# Patient Record
Sex: FEMALE | Race: WHITE | NOT HISPANIC OR LATINO | Employment: UNEMPLOYED | URBAN - METROPOLITAN AREA
[De-identification: names, ages, dates, MRNs, and addresses within clinical notes are randomized per-mention and may not be internally consistent; named-entity substitution may affect disease eponyms.]

---

## 2017-01-04 ENCOUNTER — OFFICE VISIT (OUTPATIENT)
Dept: FAMILY MEDICINE CLINIC | Facility: CLINIC | Age: 42
End: 2017-01-04

## 2017-01-04 VITALS
OXYGEN SATURATION: 97 % | WEIGHT: 191.8 LBS | SYSTOLIC BLOOD PRESSURE: 105 MMHG | HEART RATE: 111 BPM | DIASTOLIC BLOOD PRESSURE: 70 MMHG | BODY MASS INDEX: 33.98 KG/M2 | TEMPERATURE: 98.3 F

## 2017-01-04 DIAGNOSIS — J45.901 ASTHMA EXACERBATION: ICD-10-CM

## 2017-01-04 DIAGNOSIS — J98.01 BRONCHOSPASM: Primary | ICD-10-CM

## 2017-01-04 DIAGNOSIS — F41.9 ANXIETY: ICD-10-CM

## 2017-01-04 PROCEDURE — 94640 AIRWAY INHALATION TREATMENT: CPT | Performed by: FAMILY MEDICINE

## 2017-01-04 PROCEDURE — 99213 OFFICE O/P EST LOW 20 MIN: CPT | Performed by: FAMILY MEDICINE

## 2017-01-04 RX ORDER — LORAZEPAM 1 MG/1
1 TABLET ORAL EVERY 8 HOURS PRN
Qty: 90 TABLET | Refills: 0 | Status: SHIPPED | OUTPATIENT
Start: 2017-01-04 | End: 2017-02-01 | Stop reason: SDUPTHER

## 2017-01-04 RX ORDER — BENZONATATE 200 MG/1
200 CAPSULE ORAL 3 TIMES DAILY PRN
Qty: 60 CAPSULE | Refills: 2 | Status: SHIPPED | OUTPATIENT
Start: 2017-01-04 | End: 2017-09-14

## 2017-01-04 RX ORDER — LEVALBUTEROL INHALATION SOLUTION 0.63 MG/3ML
0.63 SOLUTION RESPIRATORY (INHALATION) EVERY 6 HOURS PRN
Status: DISCONTINUED | OUTPATIENT
Start: 2017-01-04 | End: 2018-06-12

## 2017-01-04 RX ORDER — MONTELUKAST SODIUM 10 MG/1
10 TABLET ORAL NIGHTLY
Qty: 30 TABLET | Refills: 5 | Status: SHIPPED | OUTPATIENT
Start: 2017-01-04 | End: 2017-09-14

## 2017-01-04 RX ORDER — LEVALBUTEROL INHALATION SOLUTION 0.63 MG/3ML
1 SOLUTION RESPIRATORY (INHALATION) EVERY 8 HOURS PRN
Qty: 90 AMPULE | Refills: 12 | Status: SHIPPED | OUTPATIENT
Start: 2017-01-04 | End: 2018-06-12

## 2017-01-04 RX ADMIN — LEVALBUTEROL INHALATION SOLUTION 0.63 MG: 0.63 SOLUTION RESPIRATORY (INHALATION) at 14:45

## 2017-01-04 NOTE — MR AVS SNAPSHOT
Charline Cook   1/4/2017 2:45 PM   Office Visit    Dept Phone:  [phone]   Encounter #:  [id number]    Provider:  Yvette Tirado MD   Department:  [facility] PRIMARY CARE                Your Full Care Plan              Today's Medication Changes          These changes are accurate as of: 1/4/17  2:58 PM.  If you have any questions, ask your nurse or doctor.               New Medication(s)Ordered:     benzonatate 200 MG capsule   Commonly known as:  TESSALON   Take 1 capsule by mouth 3 (Three) Times a Day As Needed for cough.   Started by:  Yvette Tirado MD       levalbuterol 0.63 MG/3ML nebulizer solution   Commonly known as:  XOPENEX   Take 1 ampule by nebulization Every 8 (Eight) Hours As Needed for wheezing.   Started by:  Yvette Tirado MD       LORazepam 1 MG tablet   Commonly known as:  ATIVAN   Take 1 tablet by mouth Every 8 (Eight) Hours As Needed for anxiety.   Started by:  Yvette Tirado MD         Stop taking medication(s)listed here:     albuterol (2.5 MG/3ML) 0.083% nebulizer solution   Commonly known as:  PROVENTIL   Stopped by:  Yvette Tirado MD           albuterol 108 (90 BASE) MCG/ACT inhaler   Commonly known as:  PROVENTIL HFA;VENTOLIN HFA   Stopped by:  Yvette Tirado MD           diazePAM 5 MG tablet   Commonly known as:  VALIUM   Stopped by:  Yvette Tirado MD                Where to Get Your Medications      These medications were sent to [facility] [address] [place] [address] [address]  AT [facility] [address] - [phone] [facility] [phone]   [address] [facility] [address]     Phone:  [phone]     benzonatate 200 MG capsule    levalbuterol 0.63 MG/3ML nebulizer solution         You can get these medications from any pharmacy     Bring a paper prescription for each of these medications     LORazepam 1 MG tablet                  Your Updated Medication  List          This list is accurate as of: 1/4/17  2:58 PM.  Always use your most recent med list.                benzonatate 200 MG capsule   Commonly known as:  TESSALON   Take 1 capsule by mouth 3 (Three) Times a Day As Needed for cough.       DULoxetine 60 MG capsule   Commonly known as:  CYMBALTA       gabapentin 800 MG tablet   Commonly known as:  NEURONTIN   One qid       haloperidol 1 MG tablet   Commonly known as:  HALDOL   TAKE 1 TABLET BY MOUTH THREE TIMES DAILY       hydroxychloroquine 200 MG tablet   Commonly known as:  PLAQUENIL       ipratropium 0.02 % nebulizer solution   Commonly known as:  ATROVENT   Use one vial per nebulizer       levalbuterol 0.63 MG/3ML nebulizer solution   Commonly known as:  XOPENEX   Take 1 ampule by nebulization Every 8 (Eight) Hours As Needed for wheezing.       LORazepam 1 MG tablet   Commonly known as:  ATIVAN   Take 1 tablet by mouth Every 8 (Eight) Hours As Needed for anxiety.       ondansetron 4 MG tablet   Commonly known as:  ZOFRAN       potassium chloride 10 MEQ CR capsule   Commonly known as:  MICRO-K       * rizatriptan 10 MG tablet   Commonly known as:  MAXALT       * rizatriptan MLT 10 MG disintegrating tablet   Commonly known as:  MAXALT-MLT   DISSOLVE 1 TABLET IN MOUTH AT ONSET OF HEADACHE. MAY REPEAT EVERY TWO HOURS AS NEEDED, MAX OF 3 TABLETS IN 24 HOURS       traMADol 50 MG tablet   Commonly known as:  ULTRAM   TAKE 2 TABLETS BY MOUTH FOUR TIMES DAILY AS NEEDED       venlafaxine 75 MG tablet   Commonly known as:  EFFEXOR   TAKE 3 TABLETS BY MOUTH DAILY       zolpidem 5 MG tablet   Commonly known as:  AMBIEN   TAKE 1 TABLET BY MOUTH EVERY DAY AT BEDTIME AS NEEDED FOR SLEEP       * Notice:  This list has 2 medication(s) that are the same as other medications prescribed for you. Read the directions carefully, and ask your doctor or other care provider to review them with you.            You Were Diagnosed With        Codes Comments    Bronchospasm    -   Primary ICD-10-CM: J98.01  ICD-9-CM: 519.11     Asthma exacerbation     ICD-10-CM: J45.901  ICD-9-CM: 493.92     Anxiety     ICD-10-CM: F41.9  ICD-9-CM: 300.00       Medications to be Given to You by a Medical Professional     Due       Frequency    (none) levalbuterol (XOPENEX) nebulizer solution 0.63 mg  Every 6 Hours PRN      Instructions     None    Patient Instructions History      Upcoming Appointments     Visit Type Date Time Department    OFFICE VISIT 2017  2:45 PM [place] Signup     [facility] [employer] allows you to send messages to your doctor, view your test results, renew your prescriptions, schedule appointments, and more. To sign up, go to [employer] and click on the Sign Up Now link in the New User? box. Enter your [employer] Activation Code exactly as it appears below along with the last four digits of your Social Security Number and your Date of Birth () to complete the sign-up process. If you do not sign up before the expiration date, you must request a new code.    [employer] Activation Code: [email or web address]  Expires: 2017  2:58 PM    If you have questions, you can email [email or web address] or call [phone] to talk to our [employer] staff. Remember, [employer] is NOT to be used for urgent needs. For medical emergencies, dial 911.               Other Info from Your Visit           Allergies     Butorphanol      Meperidine      Methylprednisolone      Prednisone        Reason for Visit     Cough     URI           Vital Signs     Blood Pressure Pulse Temperature Weight Last Menstrual Period Oxygen Saturation    105/70 111 98.3 °F (36.8 °C) 191 lb 12.8 oz (87 kg) (LMP Unknown) 97%    Body Mass Index Smoking Status                33.98 kg/m2 Never Smoker          Problems and Diagnoses Noted     Anxiety problem    Bronchial spasms    -  Primary    Asthma exacerbation          Medications Administered     levalbuterol (XOPENEX) nebulizer solution  0.63 mg

## 2017-01-04 NOTE — PROGRESS NOTES
Subjective   Charline Cook is a 41 y.o. female. CC: cough    History of Present Illness     Charline is a 41 year old female who comes in today with the complaint that she is not improving from her last visit here on 12/15.  After taking the steroids she felt better for about a week but the symptoms recurred on 12/24 and have persisted.  She complains of wheezing and shortness of breath.  She coughs but it is nonproductive.  She is taking Duoneb nebulizer treatments at home every 4 hours while awake.  They help for an hour or two and then the wheezing recurs.  She has tried Delsym for the cough but it does not help at all.  She has a cough which keeps her awake.      The following portions of the patient's history were reviewed and updated as appropriate: allergies, current medications, past medical history, past social history, past surgical history and problem list.    Review of Systems   Constitutional: Positive for fatigue.   HENT: Negative.  Negative for congestion, rhinorrhea and sinus pressure.    Respiratory: Positive for cough, shortness of breath and wheezing.    Cardiovascular: Negative.  Negative for chest pain, palpitations and leg swelling.   Gastrointestinal: Negative.  Negative for abdominal pain and nausea.   Genitourinary: Negative.  Negative for difficulty urinating.   Musculoskeletal: Positive for myalgias.   Skin: Negative.  Negative for rash.   Neurological: Negative.  Negative for dizziness, light-headedness and headaches.   Psychiatric/Behavioral: The patient is nervous/anxious.        Objective   Physical Exam   Constitutional: She appears well-developed and well-nourished. No distress.   Cardiovascular: Normal rate, regular rhythm and normal heart sounds.    No murmur heard.  Pulmonary/Chest: Effort normal. She has decreased breath sounds in the right upper field, the right middle field, the right lower field, the left upper field, the left middle field and the left lower field. She has wheezes  in the right upper field, the right middle field, the right lower field, the left upper field, the left middle field and the left lower field. She has no rhonchi. She has no rales.   Nursing note and vitals reviewed.  Breath sounds much improved after the nebulizer treatment.  Pulse ox remained the same at 97%.  Patient reported that her breathing was much improved.  Vitals:    01/04/17 1424   BP: 105/70   Pulse: 111   Temp: 98.3 °F (36.8 °C)   SpO2: 97%     Assessment/Plan   Problems Addressed this Visit        Other    Anxiety    Relevant Medications    LORazepam (ATIVAN) 1 MG tablet      Other Visit Diagnoses     Bronchospasm    -  Primary    Relevant Medications    levalbuterol (XOPENEX) nebulizer solution 0.63 mg    levalbuterol (XOPENEX) 0.63 MG/3ML nebulizer solution    benzonatate (TESSALON) 200 MG capsule    montelukast (SINGULAIR) 10 MG tablet    Asthma exacerbation        Relevant Medications    levalbuterol (XOPENEX) 0.63 MG/3ML nebulizer solution    montelukast (SINGULAIR) 10 MG tablet

## 2017-01-16 RX ORDER — ZOLPIDEM TARTRATE 5 MG/1
TABLET ORAL
Qty: 30 TABLET | Refills: 0 | OUTPATIENT
Start: 2017-01-16 | End: 2017-02-17 | Stop reason: SDUPTHER

## 2017-01-18 RX ORDER — TRAMADOL HYDROCHLORIDE 50 MG/1
TABLET ORAL
Qty: 240 TABLET | Refills: 0 | OUTPATIENT
Start: 2017-01-18 | End: 2017-04-09 | Stop reason: SDUPTHER

## 2017-02-01 DIAGNOSIS — F41.9 ANXIETY: ICD-10-CM

## 2017-02-02 RX ORDER — LORAZEPAM 1 MG/1
TABLET ORAL
Qty: 90 TABLET | Refills: 0 | OUTPATIENT
Start: 2017-02-02 | End: 2017-02-28 | Stop reason: SDUPTHER

## 2017-02-03 DIAGNOSIS — R11.0 NAUSEA: Primary | ICD-10-CM

## 2017-02-03 RX ORDER — ONDANSETRON 4 MG/1
4 TABLET, FILM COATED ORAL EVERY 6 HOURS
Qty: 20 TABLET | Refills: 0 | Status: SHIPPED | OUTPATIENT
Start: 2017-02-03 | End: 2017-03-12 | Stop reason: SDUPTHER

## 2017-02-10 ENCOUNTER — TELEPHONE (OUTPATIENT)
Dept: FAMILY MEDICINE CLINIC | Facility: CLINIC | Age: 42
End: 2017-02-10

## 2017-02-10 NOTE — TELEPHONE ENCOUNTER
Pt is out of town and forgot her ativan medication and was wondering if it is ok if we can call in 3 days worth to the pharmacy?

## 2017-02-10 NOTE — TELEPHONE ENCOUNTER
Okay, call her in Ativan 1 mg by mouth daily when necessary anxiety, #3.  And please do a [place] on her last one was in July

## 2017-02-17 RX ORDER — ZOLPIDEM TARTRATE 5 MG/1
TABLET ORAL
Qty: 30 TABLET | Refills: 0 | Status: SHIPPED | OUTPATIENT
Start: 2017-02-17 | End: 2017-03-21 | Stop reason: SDUPTHER

## 2017-02-28 DIAGNOSIS — F41.9 ANXIETY: ICD-10-CM

## 2017-03-01 RX ORDER — LORAZEPAM 1 MG/1
TABLET ORAL
Qty: 90 TABLET | Refills: 0 | OUTPATIENT
Start: 2017-03-01 | End: 2017-03-07 | Stop reason: SDUPTHER

## 2017-03-07 ENCOUNTER — OFFICE VISIT (OUTPATIENT)
Dept: FAMILY MEDICINE CLINIC | Facility: CLINIC | Age: 42
End: 2017-03-07

## 2017-03-07 VITALS
DIASTOLIC BLOOD PRESSURE: 70 MMHG | HEART RATE: 103 BPM | SYSTOLIC BLOOD PRESSURE: 110 MMHG | WEIGHT: 186.8 LBS | HEIGHT: 63 IN | TEMPERATURE: 98.6 F | BODY MASS INDEX: 33.1 KG/M2 | OXYGEN SATURATION: 98 %

## 2017-03-07 DIAGNOSIS — M79.7 FIBROMYALGIA: ICD-10-CM

## 2017-03-07 DIAGNOSIS — F41.9 ANXIETY: Primary | ICD-10-CM

## 2017-03-07 PROCEDURE — 99213 OFFICE O/P EST LOW 20 MIN: CPT | Performed by: FAMILY MEDICINE

## 2017-03-07 RX ORDER — GABAPENTIN 800 MG/1
TABLET ORAL
Qty: 120 TABLET | Refills: 5 | Status: SHIPPED | OUTPATIENT
Start: 2017-03-07 | End: 2017-08-16 | Stop reason: SDUPTHER

## 2017-03-07 RX ORDER — DULOXETIN HYDROCHLORIDE 60 MG/1
60 CAPSULE, DELAYED RELEASE ORAL DAILY
Qty: 30 CAPSULE | Refills: 5 | Status: SHIPPED | OUTPATIENT
Start: 2017-03-07 | End: 2017-08-30 | Stop reason: SDUPTHER

## 2017-03-07 RX ORDER — LORAZEPAM 1 MG/1
1 TABLET ORAL EVERY 8 HOURS PRN
Qty: 90 TABLET | Refills: 0 | Status: SHIPPED | OUTPATIENT
Start: 2017-03-07 | End: 2017-04-09 | Stop reason: SDUPTHER

## 2017-03-07 NOTE — PROGRESS NOTES
Subjective   Charline Cook is a 41 y.o. female. CC: anxiety    History of Present Illness   Charline is a 41 year old female who comes in today for anxiety.  She needs refills on the Ativan and the Cymbalta.  She reports that both medications are working well.  She also needs a new prescriptions for Gabapentin.  It has helped relieve her symptoms a great deal.  Overall she reports that she is doing fairly well.        The following portions of the patient's history were reviewed and updated as appropriate: allergies, current medications, past medical history, past social history, past surgical history and problem list.    Review of Systems   Constitutional: Negative.  Negative for fatigue.   HENT: Negative.  Negative for congestion, postnasal drip and rhinorrhea.    Respiratory: Negative.  Negative for cough, shortness of breath and wheezing.    Cardiovascular: Negative.  Negative for chest pain, palpitations and leg swelling.   Gastrointestinal: Negative.  Negative for abdominal pain, blood in stool, constipation, diarrhea, nausea and vomiting.   Genitourinary: Negative.  Negative for difficulty urinating, flank pain and hematuria.   Musculoskeletal: Positive for myalgias.   Skin: Negative.  Negative for rash.   Neurological: Negative.  Negative for dizziness, light-headedness and headaches.   Psychiatric/Behavioral: Positive for dysphoric mood. Negative for sleep disturbance. The patient is nervous/anxious.        Objective   Physical Exam   Constitutional: She is oriented to person, place, and time. She appears well-developed and well-nourished.   Cardiovascular: Normal rate, regular rhythm and normal heart sounds.    No murmur heard.  Pulmonary/Chest: Effort normal and breath sounds normal. No respiratory distress.   Neurological: She is alert and oriented to person, place, and time. No cranial nerve deficit.   Skin: Skin is warm and dry. No rash noted.   Psychiatric: She has a normal mood and affect. Her behavior  is normal.   Nursing note and vitals reviewed.    Vitals:    03/07/17 1514   BP: 110/70   Pulse: 103   Temp: 98.6 °F (37 °C)   SpO2: 98%     Assessment/Plan   Problems Addressed this Visit        Musculoskeletal and Integument    Fibromyalgia    Relevant Medications    gabapentin (NEURONTIN) 800 MG tablet       Other    Anxiety - Primary    Relevant Medications    DULoxetine (CYMBALTA) 60 MG capsule    LORazepam (ATIVAN) 1 MG tablet        The patient has read and signed the [facility] [employer] Substance Contract.  I will continue to see patient for regular follow up appointments.  They are well controlled on their medication.  KHUSHBOO is updated every 3 months. The patient is aware of the potential for addiction and dependence.    The patient has read and signed the [facility] [employer] Substance Contract.  I will continue to see patient for regular follow up appointments.  They are well controlled on their medication.  KHUSHBOO is updated every 3 months. The patient is aware of the potential for addiction. The patient has read and signed the [facility] [employer] Substance Contract.  I will continue to see patient for regular follow up appointments.  They are well controlled on their medication.  KHUSHBOO is updated every 3 months. The patient is aware of the potential for addiction and dependence.on and dependence.

## 2017-03-12 DIAGNOSIS — R11.0 NAUSEA: ICD-10-CM

## 2017-03-13 RX ORDER — ONDANSETRON 4 MG/1
TABLET, FILM COATED ORAL
Qty: 20 TABLET | Refills: 0 | Status: SHIPPED | OUTPATIENT
Start: 2017-03-13 | End: 2017-04-09 | Stop reason: SDUPTHER

## 2017-03-21 RX ORDER — ZOLPIDEM TARTRATE 5 MG/1
5 TABLET ORAL NIGHTLY PRN
Qty: 30 TABLET | Refills: 0 | OUTPATIENT
Start: 2017-03-21 | End: 2017-05-09 | Stop reason: SDUPTHER

## 2017-04-09 DIAGNOSIS — R11.0 NAUSEA: ICD-10-CM

## 2017-04-09 DIAGNOSIS — F32.A DEPRESSION: ICD-10-CM

## 2017-04-09 DIAGNOSIS — F41.9 ANXIETY: ICD-10-CM

## 2017-04-10 RX ORDER — ONDANSETRON 4 MG/1
TABLET, FILM COATED ORAL
Qty: 20 TABLET | Refills: 0 | Status: SHIPPED | OUTPATIENT
Start: 2017-04-10 | End: 2017-09-14

## 2017-04-10 RX ORDER — TRAMADOL HYDROCHLORIDE 50 MG/1
TABLET ORAL
Qty: 240 TABLET | Refills: 0 | OUTPATIENT
Start: 2017-04-10 | End: 2017-07-12 | Stop reason: SDUPTHER

## 2017-04-10 RX ORDER — LORAZEPAM 1 MG/1
TABLET ORAL
Qty: 90 TABLET | Refills: 0 | OUTPATIENT
Start: 2017-04-10 | End: 2017-05-07 | Stop reason: SDUPTHER

## 2017-04-10 RX ORDER — VENLAFAXINE 75 MG/1
TABLET ORAL
Qty: 90 TABLET | Refills: 0 | Status: SHIPPED | OUTPATIENT
Start: 2017-04-10 | End: 2017-05-08 | Stop reason: SDUPTHER

## 2017-04-10 RX ORDER — HALOPERIDOL 1 MG/1
TABLET ORAL
Qty: 90 TABLET | Refills: 0 | Status: SHIPPED | OUTPATIENT
Start: 2017-04-10 | End: 2017-05-10 | Stop reason: SDUPTHER

## 2017-05-07 DIAGNOSIS — F41.9 ANXIETY: ICD-10-CM

## 2017-05-08 DIAGNOSIS — F32.A DEPRESSION: ICD-10-CM

## 2017-05-08 RX ORDER — LORAZEPAM 1 MG/1
TABLET ORAL
Qty: 90 TABLET | Refills: 0 | Status: SHIPPED | OUTPATIENT
Start: 2017-05-08 | End: 2017-06-06 | Stop reason: SDUPTHER

## 2017-05-09 RX ORDER — VENLAFAXINE 75 MG/1
TABLET ORAL
Qty: 90 TABLET | Refills: 0 | Status: SHIPPED | OUTPATIENT
Start: 2017-05-09 | End: 2017-06-04 | Stop reason: SDUPTHER

## 2017-05-10 RX ORDER — HALOPERIDOL 1 MG/1
TABLET ORAL
Qty: 90 TABLET | Refills: 0 | Status: SHIPPED | OUTPATIENT
Start: 2017-05-10 | End: 2017-06-04 | Stop reason: SDUPTHER

## 2017-05-10 RX ORDER — ZOLPIDEM TARTRATE 5 MG/1
5 TABLET ORAL NIGHTLY PRN
Qty: 30 TABLET | Refills: 0 | OUTPATIENT
Start: 2017-05-10 | End: 2017-06-11 | Stop reason: SDUPTHER

## 2017-06-04 DIAGNOSIS — F32.A DEPRESSION: ICD-10-CM

## 2017-06-05 RX ORDER — HALOPERIDOL 1 MG/1
TABLET ORAL
Qty: 90 TABLET | Refills: 0 | Status: SHIPPED | OUTPATIENT
Start: 2017-06-05 | End: 2017-07-09 | Stop reason: SDUPTHER

## 2017-06-05 RX ORDER — VENLAFAXINE 75 MG/1
TABLET ORAL
Qty: 90 TABLET | Refills: 0 | Status: SHIPPED | OUTPATIENT
Start: 2017-06-05 | End: 2017-07-09 | Stop reason: SDUPTHER

## 2017-06-06 DIAGNOSIS — F41.9 ANXIETY: ICD-10-CM

## 2017-06-07 RX ORDER — LORAZEPAM 1 MG/1
TABLET ORAL
Qty: 90 TABLET | Refills: 0 | Status: SHIPPED | OUTPATIENT
Start: 2017-06-07 | End: 2017-07-05 | Stop reason: SDUPTHER

## 2017-06-11 DIAGNOSIS — F51.01 PRIMARY INSOMNIA: Primary | ICD-10-CM

## 2017-06-12 DIAGNOSIS — F51.01 PRIMARY INSOMNIA: ICD-10-CM

## 2017-06-12 RX ORDER — ZOLPIDEM TARTRATE 5 MG/1
TABLET ORAL
Qty: 30 TABLET | Refills: 0 | OUTPATIENT
Start: 2017-06-12 | End: 2017-07-09 | Stop reason: SDUPTHER

## 2017-06-12 RX ORDER — ZOLPIDEM TARTRATE 5 MG/1
TABLET ORAL
Qty: 30 TABLET | Refills: 0 | OUTPATIENT
Start: 2017-06-12

## 2017-06-13 ENCOUNTER — OFFICE VISIT (OUTPATIENT)
Dept: FAMILY MEDICINE CLINIC | Facility: CLINIC | Age: 42
End: 2017-06-13

## 2017-06-13 VITALS
WEIGHT: 175 LBS | TEMPERATURE: 99.3 F | DIASTOLIC BLOOD PRESSURE: 80 MMHG | HEIGHT: 63 IN | OXYGEN SATURATION: 98 % | BODY MASS INDEX: 31.01 KG/M2 | SYSTOLIC BLOOD PRESSURE: 115 MMHG | HEART RATE: 104 BPM

## 2017-06-13 DIAGNOSIS — F51.01 PRIMARY INSOMNIA: ICD-10-CM

## 2017-06-13 DIAGNOSIS — M79.7 FIBROMYALGIA: ICD-10-CM

## 2017-06-13 DIAGNOSIS — F41.9 ANXIETY: Primary | ICD-10-CM

## 2017-06-13 PROCEDURE — 99213 OFFICE O/P EST LOW 20 MIN: CPT | Performed by: FAMILY MEDICINE

## 2017-06-13 NOTE — PROGRESS NOTES
Subjective   Charline Cook is a 41 y.o. female. CC: anxiety    History of Present Illness   Charline is a 41 year old female who comes in today for check on her anxiety.  She reports that she is doing well on the current medication regimen.  Lorazepam keeps her anxiety under good control.  Tramadol is helping to control the pain she has from fibromyalgia.  She sleeps well on the Ambien.  No adverse side effects from any of the medications.      The following portions of the patient's history were reviewed and updated as appropriate: allergies, current medications, past medical history, past social history, past surgical history and problem list.    Review of Systems   Constitutional: Positive for fatigue. Negative for unexpected weight change.   HENT: Negative.  Negative for congestion.    Respiratory: Negative.  Negative for cough, shortness of breath and wheezing.    Cardiovascular: Negative.  Negative for chest pain, palpitations and leg swelling.   Gastrointestinal: Negative.  Negative for abdominal pain, blood in stool, constipation, diarrhea, nausea and vomiting.   Genitourinary: Negative.  Negative for difficulty urinating, frequency and hematuria.   Musculoskeletal: Positive for myalgias.   Skin: Negative.  Negative for rash.   Neurological: Negative.  Negative for dizziness, light-headedness and headaches.   Psychiatric/Behavioral: Positive for sleep disturbance. The patient is nervous/anxious.        Objective   Physical Exam   Constitutional: She is oriented to person, place, and time. She appears well-developed and well-nourished.   Neck: Normal range of motion. Neck supple. No thyromegaly present.   Cardiovascular: Normal rate, regular rhythm and normal heart sounds.    No murmur heard.  Pulmonary/Chest: Effort normal and breath sounds normal. No respiratory distress.   Neurological: She is alert and oriented to person, place, and time.   Skin: Skin is warm and dry. No rash noted.   Psychiatric: She has a  normal mood and affect. Her behavior is normal.   Nursing note and vitals reviewed.    Vitals:    06/13/17 1450   BP: 115/80   Pulse: 104   Temp: 99.3 °F (37.4 °C)   SpO2: 98%     Assessment/Plan   Problems Addressed this Visit        Musculoskeletal and Integument    Fibromyalgia       Other    Anxiety - Primary    Insomnia        She will have her pharmacy contact us when refills on her medications are needed.    The patient has read and signed the [facility] Substance Contract.  I will continue to see patient for regular follow up appointments.  They are well controlled on their medication.  KHUSHBOO is updated every 3 months. The patient is aware of the potential for addiction and dependence.    The patient has read and signed the [employer] Substance Contract.  I will continue to see patient for regular follow up appointments.  They are well controlled on their medication.  KHUSHBOO is updated every 3 months. The patient is aware of the potential for addiction. The patient has read and signed the [employer] Substance Contract.  I will continue to see patient for regular follow up appointments.  They are well controlled on their medication.  KHUSHBOO is updated every 3 months. The patient is aware of the potential for addiction and dependence.on and dependence.

## 2017-07-05 DIAGNOSIS — F41.9 ANXIETY: ICD-10-CM

## 2017-07-05 RX ORDER — LORAZEPAM 1 MG/1
1 TABLET ORAL EVERY 8 HOURS PRN
Qty: 90 TABLET | Refills: 0 | OUTPATIENT
Start: 2017-07-05 | End: 2017-08-02 | Stop reason: SDUPTHER

## 2017-07-05 NOTE — TELEPHONE ENCOUNTER
Last refill 6/7/17 #90 takes it 1 every 8 hours as needed  Last seen 06/13/17    Pt is leaving out of town with  tomorrow and would like a refill before then.

## 2017-07-09 DIAGNOSIS — F32.A DEPRESSION: ICD-10-CM

## 2017-07-09 DIAGNOSIS — F51.01 PRIMARY INSOMNIA: ICD-10-CM

## 2017-07-10 RX ORDER — HALOPERIDOL 1 MG/1
TABLET ORAL
Qty: 90 TABLET | Refills: 0 | Status: SHIPPED | OUTPATIENT
Start: 2017-07-10 | End: 2017-08-02 | Stop reason: SDUPTHER

## 2017-07-10 RX ORDER — ZOLPIDEM TARTRATE 5 MG/1
TABLET ORAL
Qty: 30 TABLET | Refills: 0 | OUTPATIENT
Start: 2017-07-10 | End: 2017-08-08 | Stop reason: SDUPTHER

## 2017-07-10 RX ORDER — VENLAFAXINE 75 MG/1
TABLET ORAL
Qty: 90 TABLET | Refills: 0 | Status: SHIPPED | OUTPATIENT
Start: 2017-07-10 | End: 2017-08-08 | Stop reason: SDUPTHER

## 2017-07-12 RX ORDER — TRAMADOL HYDROCHLORIDE 50 MG/1
TABLET ORAL
Qty: 240 TABLET | Refills: 0 | OUTPATIENT
Start: 2017-07-12 | End: 2017-09-15 | Stop reason: SDUPTHER

## 2017-08-02 DIAGNOSIS — F41.9 ANXIETY: ICD-10-CM

## 2017-08-02 DIAGNOSIS — G43.009 MIGRAINE WITHOUT AURA AND WITHOUT STATUS MIGRAINOSUS, NOT INTRACTABLE: ICD-10-CM

## 2017-08-02 RX ORDER — HALOPERIDOL 1 MG/1
TABLET ORAL
Qty: 90 TABLET | Refills: 0 | Status: SHIPPED | OUTPATIENT
Start: 2017-08-02 | End: 2017-08-30 | Stop reason: SDUPTHER

## 2017-08-02 RX ORDER — LORAZEPAM 1 MG/1
TABLET ORAL
Qty: 90 TABLET | Refills: 0 | OUTPATIENT
Start: 2017-08-02 | End: 2017-08-30 | Stop reason: SDUPTHER

## 2017-08-02 RX ORDER — RIZATRIPTAN BENZOATE 10 MG/1
TABLET, ORALLY DISINTEGRATING ORAL
Qty: 12 TABLET | Refills: 5 | Status: SHIPPED | OUTPATIENT
Start: 2017-08-02 | End: 2018-05-16 | Stop reason: SDUPTHER

## 2017-08-03 ENCOUNTER — TELEPHONE (OUTPATIENT)
Dept: FAMILY MEDICINE CLINIC | Facility: CLINIC | Age: 42
End: 2017-08-03

## 2017-08-03 NOTE — TELEPHONE ENCOUNTER
Pt said that she is having the feeling to urinate and she doesn't have to, pressure, no burning or itching.

## 2017-08-03 NOTE — TELEPHONE ENCOUNTER
Pt called and said that she has another uti she said that she gets it every time she has intercourse. She was wondering if you can call in Macro[employer] for her?

## 2017-08-08 DIAGNOSIS — F51.01 PRIMARY INSOMNIA: ICD-10-CM

## 2017-08-08 DIAGNOSIS — F32.A DEPRESSION: ICD-10-CM

## 2017-08-08 DIAGNOSIS — M79.7 FIBROMYALGIA: ICD-10-CM

## 2017-08-09 RX ORDER — VENLAFAXINE 75 MG/1
TABLET ORAL
Qty: 90 TABLET | Refills: 0 | Status: SHIPPED | OUTPATIENT
Start: 2017-08-09 | End: 2017-09-07 | Stop reason: SDUPTHER

## 2017-08-09 RX ORDER — GABAPENTIN 800 MG/1
TABLET ORAL
Qty: 120 TABLET | Refills: 0 | OUTPATIENT
Start: 2017-08-09

## 2017-08-09 RX ORDER — ZOLPIDEM TARTRATE 5 MG/1
TABLET ORAL
Qty: 30 TABLET | Refills: 0 | OUTPATIENT
Start: 2017-08-09 | End: 2017-09-07 | Stop reason: SDUPTHER

## 2017-08-16 DIAGNOSIS — M79.7 FIBROMYALGIA: ICD-10-CM

## 2017-08-16 RX ORDER — GABAPENTIN 800 MG/1
TABLET ORAL
Qty: 120 TABLET | Refills: 0 | OUTPATIENT
Start: 2017-08-16 | End: 2017-09-10 | Stop reason: SDUPTHER

## 2017-08-30 DIAGNOSIS — F41.9 ANXIETY: ICD-10-CM

## 2017-08-31 RX ORDER — DULOXETIN HYDROCHLORIDE 60 MG/1
CAPSULE, DELAYED RELEASE ORAL
Qty: 30 CAPSULE | Refills: 0 | Status: SHIPPED | OUTPATIENT
Start: 2017-08-31 | End: 2017-10-04 | Stop reason: SDUPTHER

## 2017-08-31 RX ORDER — HALOPERIDOL 1 MG/1
TABLET ORAL
Qty: 90 TABLET | Refills: 0 | Status: SHIPPED | OUTPATIENT
Start: 2017-08-31 | End: 2017-10-04 | Stop reason: SDUPTHER

## 2017-08-31 RX ORDER — LORAZEPAM 1 MG/1
TABLET ORAL
Qty: 90 TABLET | Refills: 0 | OUTPATIENT
Start: 2017-08-31 | End: 2017-09-28 | Stop reason: SDUPTHER

## 2017-09-07 DIAGNOSIS — F51.01 PRIMARY INSOMNIA: ICD-10-CM

## 2017-09-07 DIAGNOSIS — F32.A DEPRESSION: ICD-10-CM

## 2017-09-08 RX ORDER — ZOLPIDEM TARTRATE 5 MG/1
TABLET ORAL
Qty: 30 TABLET | Refills: 0 | OUTPATIENT
Start: 2017-09-08 | End: 2017-10-10 | Stop reason: SDUPTHER

## 2017-09-08 RX ORDER — VENLAFAXINE 75 MG/1
TABLET ORAL
Qty: 90 TABLET | Refills: 0 | Status: SHIPPED | OUTPATIENT
Start: 2017-09-08 | End: 2017-10-04 | Stop reason: SDUPTHER

## 2017-09-10 DIAGNOSIS — M79.7 FIBROMYALGIA: ICD-10-CM

## 2017-09-11 RX ORDER — GABAPENTIN 800 MG/1
TABLET ORAL
Qty: 120 TABLET | Refills: 0 | OUTPATIENT
Start: 2017-09-11 | End: 2017-10-12 | Stop reason: SDUPTHER

## 2017-09-14 ENCOUNTER — OFFICE VISIT (OUTPATIENT)
Dept: FAMILY MEDICINE CLINIC | Facility: CLINIC | Age: 42
End: 2017-09-14

## 2017-09-14 VITALS
BODY MASS INDEX: 31.34 KG/M2 | HEART RATE: 113 BPM | HEIGHT: 63 IN | DIASTOLIC BLOOD PRESSURE: 60 MMHG | WEIGHT: 176.9 LBS | OXYGEN SATURATION: 98 % | TEMPERATURE: 98 F | SYSTOLIC BLOOD PRESSURE: 115 MMHG

## 2017-09-14 DIAGNOSIS — Z23 ENCOUNTER FOR IMMUNIZATION: ICD-10-CM

## 2017-09-14 DIAGNOSIS — M79.7 FIBROMYALGIA: Primary | ICD-10-CM

## 2017-09-14 DIAGNOSIS — F41.9 ANXIETY: ICD-10-CM

## 2017-09-14 PROCEDURE — 99213 OFFICE O/P EST LOW 20 MIN: CPT | Performed by: FAMILY MEDICINE

## 2017-09-14 PROCEDURE — 90471 IMMUNIZATION ADMIN: CPT | Performed by: FAMILY MEDICINE

## 2017-09-14 PROCEDURE — 90686 IIV4 VACC NO PRSV 0.5 ML IM: CPT | Performed by: FAMILY MEDICINE

## 2017-09-14 NOTE — PROGRESS NOTES
Subjective   Charline Cook is a 41 y.o. female. CC: anxiety    History of Present Illness   Charline is a 41 year old female who comes in today for anxiety.  She does not need any prescriptions today, just due for her check up.  Reviewed her medications.  She states that she is only taking the Tramadol twice a day.    She is feeling better than she has in a long time and is happy with her current medication regimens.  She sometimes does not take the full dose of the Gabapentin.  Would like her flu shot today.        The following portions of the patient's history were reviewed and updated as appropriate: allergies, current medications, past medical history, past social history, past surgical history and problem list.    Review of Systems   Constitutional: Positive for fatigue. Negative for unexpected weight change.   HENT: Negative.  Negative for congestion.    Respiratory: Negative.  Negative for cough, shortness of breath and wheezing.    Cardiovascular: Negative.  Negative for chest pain, palpitations and leg swelling.   Gastrointestinal: Negative.  Negative for blood in stool, constipation, diarrhea and nausea.   Genitourinary: Negative.  Negative for difficulty urinating.   Musculoskeletal: Positive for arthralgias and myalgias.   Skin: Negative.  Negative for rash.   Neurological: Negative.  Negative for dizziness, light-headedness and headaches.   Psychiatric/Behavioral: Positive for dysphoric mood and sleep disturbance. The patient is nervous/anxious.        Objective   Physical Exam   Constitutional: She is oriented to person, place, and time. She appears well-developed and well-nourished.   Neck: Normal range of motion. Neck supple.   Cardiovascular: Normal rate, regular rhythm and normal heart sounds.    Pulmonary/Chest: Effort normal and breath sounds normal.   Neurological: She is alert and oriented to person, place, and time.   Skin: Skin is warm and dry. No rash noted.   Psychiatric: She has a normal mood  and affect. Her behavior is normal.   Nursing note and vitals reviewed.    Vitals:    09/14/17 1505   BP: 115/60   Pulse: 113   Temp: 98 °F (36.7 °C)   SpO2: 98%     Assessment/Plan   Problems Addressed this Visit        Musculoskeletal and Integument    Fibromyalgia - Primary       Other    Anxiety      Other Visit Diagnoses     Encounter for immunization        Relevant Orders    Flu Vaccine Intradermal Quad 18-64YR (FLUZONE [id number])        After obtaining informed consent, the immunization is given by Amie CORTES    The patient has read and signed the [facility] [employer] Substance Contract.  I will continue to see patient for regular follow up appointments.  They are well controlled on their medication.  KHUSHBOO is updated every 3 months. The patient is aware of the potential for addiction and dependence.    The patient has read and signed the [facility] [employer] Substance Contract.  I will continue to see patient for regular follow up appointments.  They are well controlled on their medication.  KHUSHBOO is updated every 3 months. The patient is aware of the potential for addiction. The patient has read and signed the [facility] [employer] Substance Contract.  I will continue to see patient for regular follow up appointments.  They are well controlled on their medication.  KHUSHBOO is updated every 3 months. The patient is aware of the potential for addiction and dependence.on and dependence.

## 2017-09-15 RX ORDER — TRAMADOL HYDROCHLORIDE 50 MG/1
50 TABLET ORAL 2 TIMES DAILY
Qty: 60 TABLET | Refills: 0 | OUTPATIENT
Start: 2017-09-15 | End: 2017-11-08 | Stop reason: SDUPTHER

## 2017-09-28 DIAGNOSIS — F41.9 ANXIETY: ICD-10-CM

## 2017-09-28 RX ORDER — LORAZEPAM 1 MG/1
TABLET ORAL
Qty: 90 TABLET | Refills: 0 | OUTPATIENT
Start: 2017-09-28 | End: 2017-12-14

## 2017-10-04 DIAGNOSIS — J20.9 BRONCHOSPASM WITH BRONCHITIS, ACUTE: ICD-10-CM

## 2017-10-04 DIAGNOSIS — F41.9 ANXIETY: ICD-10-CM

## 2017-10-04 DIAGNOSIS — F32.A DEPRESSION: ICD-10-CM

## 2017-10-04 RX ORDER — ALBUTEROL SULFATE 90 UG/1
AEROSOL, METERED RESPIRATORY (INHALATION)
Qty: 18 G | Refills: 0 | Status: SHIPPED | OUTPATIENT
Start: 2017-10-04 | End: 2017-12-09 | Stop reason: SDUPTHER

## 2017-10-04 RX ORDER — HALOPERIDOL 1 MG/1
TABLET ORAL
Qty: 90 TABLET | Refills: 0 | Status: SHIPPED | OUTPATIENT
Start: 2017-10-04 | End: 2017-11-02 | Stop reason: SDUPTHER

## 2017-10-04 RX ORDER — DULOXETIN HYDROCHLORIDE 60 MG/1
CAPSULE, DELAYED RELEASE ORAL
Qty: 30 CAPSULE | Refills: 0 | Status: SHIPPED | OUTPATIENT
Start: 2017-10-04 | End: 2017-11-02 | Stop reason: SDUPTHER

## 2017-10-04 RX ORDER — VENLAFAXINE 75 MG/1
TABLET ORAL
Qty: 90 TABLET | Refills: 0 | Status: SHIPPED | OUTPATIENT
Start: 2017-10-04 | End: 2017-11-02 | Stop reason: SDUPTHER

## 2017-10-10 DIAGNOSIS — F51.01 PRIMARY INSOMNIA: ICD-10-CM

## 2017-10-10 RX ORDER — ZOLPIDEM TARTRATE 5 MG/1
TABLET ORAL
Qty: 30 TABLET | Refills: 0 | OUTPATIENT
Start: 2017-10-10 | End: 2017-12-14

## 2017-10-12 DIAGNOSIS — M79.7 FIBROMYALGIA: ICD-10-CM

## 2017-10-13 RX ORDER — GABAPENTIN 800 MG/1
TABLET ORAL
Qty: 120 TABLET | Refills: 0 | OUTPATIENT
Start: 2017-10-13 | End: 2017-11-09 | Stop reason: SDUPTHER

## 2017-11-02 DIAGNOSIS — F41.9 ANXIETY: ICD-10-CM

## 2017-11-02 DIAGNOSIS — F32.A DEPRESSION: ICD-10-CM

## 2017-11-02 RX ORDER — DULOXETIN HYDROCHLORIDE 60 MG/1
CAPSULE, DELAYED RELEASE ORAL
Qty: 30 CAPSULE | Refills: 0 | Status: SHIPPED | OUTPATIENT
Start: 2017-11-02 | End: 2017-12-06 | Stop reason: SDUPTHER

## 2017-11-02 RX ORDER — HALOPERIDOL 1 MG/1
TABLET ORAL
Qty: 90 TABLET | Refills: 0 | Status: SHIPPED | OUTPATIENT
Start: 2017-11-02 | End: 2017-12-04 | Stop reason: SDUPTHER

## 2017-11-02 RX ORDER — VENLAFAXINE 75 MG/1
TABLET ORAL
Qty: 90 TABLET | Refills: 0 | Status: SHIPPED | OUTPATIENT
Start: 2017-11-02 | End: 2017-12-04 | Stop reason: SDUPTHER

## 2017-11-08 RX ORDER — TRAMADOL HYDROCHLORIDE 50 MG/1
50 TABLET ORAL 2 TIMES DAILY
Qty: 60 TABLET | Refills: 0 | OUTPATIENT
Start: 2017-11-08 | End: 2017-12-07 | Stop reason: SDUPTHER

## 2017-11-09 DIAGNOSIS — M79.7 FIBROMYALGIA: ICD-10-CM

## 2017-11-10 RX ORDER — GABAPENTIN 800 MG/1
TABLET ORAL
Qty: 120 TABLET | Refills: 0 | OUTPATIENT
Start: 2017-11-10 | End: 2017-12-07 | Stop reason: SDUPTHER

## 2017-12-04 DIAGNOSIS — F32.A DEPRESSION: ICD-10-CM

## 2017-12-04 RX ORDER — VENLAFAXINE 75 MG/1
TABLET ORAL
Qty: 90 TABLET | Refills: 0 | Status: SHIPPED | OUTPATIENT
Start: 2017-12-04 | End: 2018-01-02 | Stop reason: SDUPTHER

## 2017-12-04 RX ORDER — HALOPERIDOL 1 MG/1
TABLET ORAL
Qty: 90 TABLET | Refills: 0 | Status: SHIPPED | OUTPATIENT
Start: 2017-12-04 | End: 2018-03-09 | Stop reason: SDUPTHER

## 2017-12-06 DIAGNOSIS — F41.9 ANXIETY: ICD-10-CM

## 2017-12-06 RX ORDER — DULOXETIN HYDROCHLORIDE 60 MG/1
CAPSULE, DELAYED RELEASE ORAL
Qty: 30 CAPSULE | Refills: 5 | Status: SHIPPED | OUTPATIENT
Start: 2017-12-06 | End: 2018-06-05 | Stop reason: SDUPTHER

## 2017-12-07 DIAGNOSIS — M79.7 FIBROMYALGIA: ICD-10-CM

## 2017-12-07 RX ORDER — TRAMADOL HYDROCHLORIDE 50 MG/1
TABLET ORAL
Qty: 60 TABLET | Refills: 0 | OUTPATIENT
Start: 2017-12-07 | End: 2018-01-02 | Stop reason: SDUPTHER

## 2017-12-07 RX ORDER — GABAPENTIN 800 MG/1
TABLET ORAL
Qty: 120 TABLET | Refills: 0 | OUTPATIENT
Start: 2017-12-07 | End: 2018-01-02 | Stop reason: SDUPTHER

## 2017-12-08 DIAGNOSIS — M79.7 FIBROMYALGIA: ICD-10-CM

## 2017-12-08 RX ORDER — GABAPENTIN 800 MG/1
TABLET ORAL
Qty: 120 TABLET | Refills: 0 | OUTPATIENT
Start: 2017-12-08

## 2017-12-08 RX ORDER — TRAMADOL HYDROCHLORIDE 50 MG/1
TABLET ORAL
Qty: 60 TABLET | Refills: 0 | OUTPATIENT
Start: 2017-12-08

## 2017-12-09 DIAGNOSIS — J20.9 BRONCHOSPASM WITH BRONCHITIS, ACUTE: ICD-10-CM

## 2017-12-11 RX ORDER — ALBUTEROL SULFATE 90 UG/1
AEROSOL, METERED RESPIRATORY (INHALATION)
Qty: 18 G | Refills: 0 | Status: SHIPPED | OUTPATIENT
Start: 2017-12-11 | End: 2018-06-12

## 2017-12-14 ENCOUNTER — OFFICE VISIT (OUTPATIENT)
Dept: FAMILY MEDICINE CLINIC | Facility: CLINIC | Age: 42
End: 2017-12-14

## 2017-12-14 VITALS
TEMPERATURE: 98.7 F | BODY MASS INDEX: 31.5 KG/M2 | OXYGEN SATURATION: 97 % | SYSTOLIC BLOOD PRESSURE: 115 MMHG | HEART RATE: 108 BPM | WEIGHT: 177.8 LBS | HEIGHT: 63 IN | DIASTOLIC BLOOD PRESSURE: 65 MMHG

## 2017-12-14 DIAGNOSIS — M32.9 SYSTEMIC LUPUS ERYTHEMATOSUS, UNSPECIFIED SLE TYPE, UNSPECIFIED ORGAN INVOLVEMENT STATUS (HCC): ICD-10-CM

## 2017-12-14 DIAGNOSIS — M79.7 FIBROMYALGIA: Primary | ICD-10-CM

## 2017-12-14 PROCEDURE — 99213 OFFICE O/P EST LOW 20 MIN: CPT | Performed by: FAMILY MEDICINE

## 2017-12-14 NOTE — PROGRESS NOTES
Subjective   Charline Cook is a 42 y.o. female. CC: fibromyalgia    History of Present Illness   Charline is a 42 year old female who comes in today for fibromyalgia.  She states that she is feeling much better recently.  She has stopped taking Ativan, Ambien and Plaquenil.  She has more energy and just feels better overall.  She has cut the Tramadol down to one bid.  That dose is working well for her and keeps the pain under control.  She sometimes adds Ibuprofen if needs something else.  The Gabapentin is helping a great deal.  She does not need any refills today---just needed to be seen for her 3 month check.        The following portions of the patient's history were reviewed and updated as appropriate: allergies, current medications, past medical history, past social history, past surgical history and problem list.    Review of Systems   Constitutional: Negative.  Negative for fatigue and unexpected weight change.   HENT: Negative.  Negative for congestion.    Respiratory: Negative.  Negative for cough, shortness of breath and wheezing.    Cardiovascular: Negative.  Negative for chest pain, palpitations and leg swelling.   Gastrointestinal: Negative.  Negative for abdominal pain, constipation, diarrhea and nausea.   Genitourinary: Negative.  Negative for difficulty urinating and hematuria.   Musculoskeletal: Positive for arthralgias, back pain and myalgias.   Skin: Negative.  Negative for rash.   Neurological: Negative.  Negative for dizziness, light-headedness and headaches.   Psychiatric/Behavioral: Positive for dysphoric mood. Negative for sleep disturbance. The patient is nervous/anxious.        Objective   Physical Exam   Constitutional: She is oriented to person, place, and time. She appears well-developed and well-nourished.   Cardiovascular: Normal rate, regular rhythm and normal heart sounds.    Pulmonary/Chest: Effort normal and breath sounds normal.   Neurological: She is alert and oriented to person,  "place, and time.   Skin: Skin is warm and dry. No rash noted.   Psychiatric: She has a normal mood and affect. Her behavior is normal.   Nursing note and vitals reviewed.    Vitals:    12/14/17 1525   BP: 115/65   Pulse: 108   Temp: 98.7 °F (37.1 °C)   SpO2: 97%   Weight: 80.6 kg (177 lb 12.8 oz)   Height: 160 cm (62.99\")     Assessment/Plan   Problems Addressed this Visit        Immune and Lymphatic    Systemic lupus erythematosus       Other    Fibromyalgia - Primary        The patient has read and signed the [facility] Controlled Substance Contract.  I will continue to see patient for regular follow up appointments.  They are well controlled on their medication.  KHUSHBOO is updated every 3 months. The patient is aware of the potential for addiction and dependence.         "

## 2018-01-02 DIAGNOSIS — M79.7 FIBROMYALGIA: ICD-10-CM

## 2018-01-02 DIAGNOSIS — F32.A DEPRESSION: ICD-10-CM

## 2018-01-02 RX ORDER — VENLAFAXINE 75 MG/1
TABLET ORAL
Qty: 90 TABLET | Refills: 5 | Status: SHIPPED | OUTPATIENT
Start: 2018-01-02 | End: 2018-07-05 | Stop reason: SDUPTHER

## 2018-01-02 RX ORDER — GABAPENTIN 800 MG/1
TABLET ORAL
Qty: 120 TABLET | Refills: 0 | OUTPATIENT
Start: 2018-01-02 | End: 2018-01-23 | Stop reason: SDUPTHER

## 2018-01-02 RX ORDER — TRAMADOL HYDROCHLORIDE 50 MG/1
TABLET ORAL
Qty: 60 TABLET | Refills: 0 | OUTPATIENT
Start: 2018-01-02 | End: 2018-02-12 | Stop reason: SDUPTHER

## 2018-01-23 ENCOUNTER — OFFICE VISIT (OUTPATIENT)
Dept: FAMILY MEDICINE CLINIC | Facility: CLINIC | Age: 43
End: 2018-01-23

## 2018-01-23 VITALS
HEART RATE: 101 BPM | SYSTOLIC BLOOD PRESSURE: 128 MMHG | OXYGEN SATURATION: 100 % | HEIGHT: 63 IN | WEIGHT: 167 LBS | BODY MASS INDEX: 29.59 KG/M2 | DIASTOLIC BLOOD PRESSURE: 70 MMHG

## 2018-01-23 DIAGNOSIS — E87.6 HYPOKALEMIA: ICD-10-CM

## 2018-01-23 DIAGNOSIS — F51.01 PRIMARY INSOMNIA: Primary | ICD-10-CM

## 2018-01-23 DIAGNOSIS — G43.909 MIGRAINE WITHOUT STATUS MIGRAINOSUS, NOT INTRACTABLE, UNSPECIFIED MIGRAINE TYPE: ICD-10-CM

## 2018-01-23 DIAGNOSIS — M79.7 FIBROMYALGIA: ICD-10-CM

## 2018-01-23 DIAGNOSIS — M32.9 SYSTEMIC LUPUS ERYTHEMATOSUS, UNSPECIFIED SLE TYPE, UNSPECIFIED ORGAN INVOLVEMENT STATUS (HCC): ICD-10-CM

## 2018-01-23 DIAGNOSIS — R79.89 ELEVATED LFTS: ICD-10-CM

## 2018-01-23 PROCEDURE — 99214 OFFICE O/P EST MOD 30 MIN: CPT | Performed by: FAMILY MEDICINE

## 2018-01-23 RX ORDER — GABAPENTIN 800 MG/1
800 TABLET ORAL 4 TIMES DAILY
Qty: 120 TABLET | Refills: 0 | Status: SHIPPED | OUTPATIENT
Start: 2018-01-23 | End: 2018-01-23 | Stop reason: SDUPTHER

## 2018-01-23 RX ORDER — GABAPENTIN 800 MG/1
800 TABLET ORAL 4 TIMES DAILY
Qty: 120 TABLET | Refills: 0 | Status: SHIPPED | OUTPATIENT
Start: 2018-01-23 | End: 2018-03-09 | Stop reason: SDUPTHER

## 2018-01-23 RX ORDER — BENZONATATE 200 MG/1
200 CAPSULE ORAL 3 TIMES DAILY PRN
Qty: 60 CAPSULE | Refills: 1 | Status: SHIPPED | OUTPATIENT
Start: 2018-01-23 | End: 2018-06-12

## 2018-01-23 RX ORDER — AMITRIPTYLINE HYDROCHLORIDE 10 MG/1
10 TABLET, FILM COATED ORAL NIGHTLY
Qty: 30 TABLET | Refills: 2 | Status: SHIPPED | OUTPATIENT
Start: 2018-01-23 | End: 2018-04-12

## 2018-01-23 RX ORDER — BUDESONIDE AND FORMOTEROL FUMARATE DIHYDRATE 160; 4.5 UG/1; UG/1
2 AEROSOL RESPIRATORY (INHALATION)
Qty: 10.2 G | Refills: 12 | Status: SHIPPED | OUTPATIENT
Start: 2018-01-23 | End: 2018-06-12

## 2018-01-23 RX ORDER — CARBOXYMETHYLCELLULOSE SODIUM, GLYCERIN, AND POLYSORBATE 80 5; 10; 5 MG/ML; MG/ML; MG/ML
SOLUTION/ DROPS OPHTHALMIC
COMMUNITY
Start: 2017-10-17 | End: 2022-01-27

## 2018-01-23 NOTE — PROGRESS NOTES
Subjective   Charline Cook is a 42 y.o. female. Pt is established in the office with Dr. Tirado. She is here with concerns about her asthma and possible exacerbation starting, insomnia, migraines.    Chief Complaint   Patient presents with   • Establish Care   • Med Refill     maxalt        History of Present Illness    Hx of hospitalization for lupus flare, stomach issue, vomiting, hypokalemia at [place], prior to this in [facility] (4 weeks prior). Was under the care of Dr. De Luna with GI. Seen by psych started on haldol she thinks for psychosis. Never saw the psychiatrist again and never taken off the haldol. She does not have a diagnosis of schizophrenia. She is taking the haldol 3 mg once daily though it is prescribed 1 mg TID. This was around 2013-14.    Asthma  Usually goes into flare in January. Usually last about 4 months. Now is having coughing going on for about 3 days at this time. Worse at night. Doing atrovent nightly and the albuterol inhaler. Feels better breathing with this but still has coughing. Uses the xopenex when she wheezes. Finds the benzonatate helpful for her cough. Was last hospitalized for asthma 1-2 years ago, never had to be in ICU or intubated for her asthma flare. Has been on singulair for about one year. Also been on zyrtec and claritin previously with no help.     Insomnia  Chronic problem. Has tried lunesta, helped for a period of time. Did not like ambien. Is taking unisom 1-2 each night. Tries to keep bed time at 10 pm but trouble falling asleep. Will finally sleep most of the time from 4 am to 9 am. Has TV on in room when trying to sleep.  is always traveling so does not share room or bed with anyone most of the time.     Migraines  Chronic problem. Tried on topomax but did not get relief, this was long time ago. She is taking Maxalt about 15 times per month. Gets headache about half days each month. Has intense nausea and vomiting with these, thinks that is why she  "has lost the weight. Used to having lots of vomiting most of her life, has also abdominal symptoms she reports related to her Lupus.     Dx with SLE at age 17, was seen by rheum but her doctor retired and she did not get another one, Dr. Tirado accepted care. Pt weaned herself off of plaquenil.  The following portions of the patient's history were reviewed and updated as appropriate: allergies, current medications, past family history, past medical history, past social history, past surgical history and problem list.    Review of Systems   Constitutional: Negative for activity change and appetite change.   HENT: Negative for congestion and rhinorrhea.    Respiratory: Positive for cough and shortness of breath. Negative for wheezing.    Cardiovascular: Negative for chest pain.   Gastrointestinal: Positive for constipation, nausea and vomiting.   Neurological: Positive for headaches.   Psychiatric/Behavioral: Positive for sleep disturbance.       Objective   Blood pressure 128/70, pulse 101, height 160 cm (62.99\"), weight 75.8 kg (167 lb), SpO2 100 %, not currently breastfeeding.  Physical Exam   Constitutional: She is oriented to person, place, and time. She appears well-nourished. No distress.   HENT:   Right Ear: External ear normal.   Left Ear: External ear normal.   Nose: Nose normal.   Mouth/Throat: Oropharynx is clear and moist. No oropharyngeal exudate.   TMs and canals normal.   Neck: Neck supple. No thyromegaly present.   Cardiovascular: Regular rhythm and normal heart sounds.  Exam reveals no gallop and no friction rub.    No murmur heard.  tachycardic   Pulmonary/Chest: Effort normal and breath sounds normal. No respiratory distress. She has no wheezes. She has no rales.   Musculoskeletal: She exhibits no edema.   Lymphadenopathy:     She has no cervical adenopathy.   Neurological: She is alert and oriented to person, place, and time.   Psychiatric: She has a normal mood and affect. Her behavior is " normal.   Vitals reviewed.      Assessment/Plan   Charline was seen today for establish care and med refill.    Diagnoses and all orders for this visit:    Primary insomnia    Migraine without status migrainosus, not intractable, unspecified migraine type    Fibromyalgia  -     Discontinue: gabapentin (NEURONTIN) 800 MG tablet; Take 1 tablet by mouth 4 (Four) Times a Day.  -     gabapentin (NEURONTIN) 800 MG tablet; Take 1 tablet by mouth 4 (Four) Times a Day.    Systemic lupus erythematosus, unspecified SLE type, unspecified organ involvement status  -     CBC & Differential    Elevated LFTs  -     Comprehensive Metabolic Panel    Hypokalemia  -     Comprehensive Metabolic Panel    Other orders  -     benzonatate (TESSALON) 200 MG capsule; Take 1 capsule by mouth 3 (Three) Times a Day As Needed for Cough.  -     budesonide-formoterol (SYMBICORT) 160-4.5 MCG/ACT inhaler; Inhale 2 puffs 2 (Two) Times a Day.  -     amitriptyline (ELAVIL) 10 MG tablet; Take 1 tablet by mouth Every Night.    insomnia  Big concern for pt, would love to get better sleep  Discussed sleep hygiene, good bed time, avoid screens  Start low dose amitriptyline  Could be worsened by the Haldol    Migraine  Needs to be on controller, will have to work on medications next time  Could tolerate propranolol given her pulse (she did not just take albuterol) says topirimate did not work for her  Can consider wean and d/c haldol as no obvious indication for this outside of the hospital    SLE  Care in the office  Not currently on medication for lupus  Reports abdominal pain, liver problems with high LFTs, and poor vision to the point she can no longer drive herself related to her SLE  May need to revisit seeing rheum    Asthma  Pt has never been on ICS per her report  Will try, counseled to use with spacer, use scheduled, and rinse mouth  Refilled benzonatate    Close follow up, 3 months, pt to call if having any issues/symptoms    Lots of potentials for  interactions in her medications with haldol, duloxetine, gabapentin, venlafaxine, amitriptyline

## 2018-01-24 LAB
ALBUMIN SERPL-MCNC: 4.5 G/DL (ref 3.5–5.2)
ALBUMIN/GLOB SERPL: 1.5 G/DL
ALP SERPL-CCNC: 154 U/L (ref 39–117)
ALT SERPL-CCNC: 12 U/L (ref 1–33)
AST SERPL-CCNC: 13 U/L (ref 1–32)
BASOPHILS # BLD AUTO: 0.06 10*3/MM3 (ref 0–0.2)
BASOPHILS NFR BLD AUTO: 1.3 % (ref 0–1.5)
BILIRUB SERPL-MCNC: 0.2 MG/DL (ref 0.1–1.2)
BUN SERPL-MCNC: 8 MG/DL (ref 6–20)
BUN/CREAT SERPL: 9.8 (ref 7–25)
CALCIUM SERPL-MCNC: 9.2 MG/DL (ref 8.6–10.5)
CHLORIDE SERPL-SCNC: 99 MMOL/L (ref 98–107)
CO2 SERPL-SCNC: 26.3 MMOL/L (ref 22–29)
CREAT SERPL-MCNC: 0.82 MG/DL (ref 0.57–1)
DIFFERENTIAL COMMENT: NORMAL
EOSINOPHIL # BLD AUTO: 0.21 10*3/MM3 (ref 0–0.7)
EOSINOPHIL NFR BLD AUTO: 4.7 % (ref 0.3–6.2)
ERYTHROCYTE [DISTWIDTH] IN BLOOD BY AUTOMATED COUNT: 17 % (ref 11.7–13)
GLOBULIN SER CALC-MCNC: 3 GM/DL
GLUCOSE SERPL-MCNC: 77 MG/DL (ref 65–99)
HCT VFR BLD AUTO: 32.2 % (ref 35.6–45.5)
HGB BLD-MCNC: 9.6 G/DL (ref 11.9–15.5)
IMM GRANULOCYTES # BLD: 0 10*3/MM3 (ref 0–0.03)
IMM GRANULOCYTES NFR BLD: 0 % (ref 0–0.5)
LYMPHOCYTES # BLD AUTO: 1.42 10*3/MM3 (ref 0.9–4.8)
LYMPHOCYTES NFR BLD AUTO: 31.6 % (ref 19.6–45.3)
MCH RBC QN AUTO: 28.6 PG (ref 26.9–32)
MCHC RBC AUTO-ENTMCNC: 29.8 G/DL (ref 32.4–36.3)
MCV RBC AUTO: 95.8 FL (ref 80.5–98.2)
MONOCYTES # BLD AUTO: 1.12 10*3/MM3 (ref 0.2–1.2)
MONOCYTES NFR BLD AUTO: 24.9 % (ref 5–12)
NEUTROPHILS # BLD AUTO: 1.69 10*3/MM3 (ref 1.9–8.1)
NEUTROPHILS NFR BLD AUTO: 37.5 % (ref 42.7–76)
PLATELET # BLD AUTO: 392 10*3/MM3 (ref 140–500)
PLATELET BLD QL SMEAR: NORMAL
POTASSIUM SERPL-SCNC: 4.1 MMOL/L (ref 3.5–5.2)
PROT SERPL-MCNC: 7.5 G/DL (ref 6–8.5)
RBC # BLD AUTO: 3.36 10*6/MM3 (ref 3.9–5.2)
RBC MORPH BLD: NORMAL
SODIUM SERPL-SCNC: 141 MMOL/L (ref 136–145)
WBC # BLD AUTO: 4.5 10*3/MM3 (ref 4.5–10.7)

## 2018-02-12 ENCOUNTER — TELEPHONE (OUTPATIENT)
Dept: FAMILY MEDICINE CLINIC | Facility: CLINIC | Age: 43
End: 2018-02-12

## 2018-02-12 DIAGNOSIS — M79.7 FIBROMYALGIA: ICD-10-CM

## 2018-02-12 DIAGNOSIS — R11.0 NAUSEA: ICD-10-CM

## 2018-02-12 RX ORDER — ONDANSETRON 4 MG/1
TABLET, FILM COATED ORAL
Qty: 20 TABLET | Refills: 0 | OUTPATIENT
Start: 2018-02-12 | End: 2018-02-16 | Stop reason: SDUPTHER

## 2018-02-12 RX ORDER — TRAMADOL HYDROCHLORIDE 50 MG/1
TABLET ORAL
Qty: 60 TABLET | Refills: 0 | OUTPATIENT
Start: 2018-02-12 | End: 2018-03-09 | Stop reason: SDUPTHER

## 2018-02-13 RX ORDER — HALOPERIDOL 1 MG/1
TABLET ORAL
Qty: 90 TABLET | Refills: 0 | OUTPATIENT
Start: 2018-02-13

## 2018-02-16 DIAGNOSIS — R11.0 NAUSEA: ICD-10-CM

## 2018-02-16 RX ORDER — ONDANSETRON 4 MG/1
4 TABLET, FILM COATED ORAL EVERY 6 HOURS PRN
Qty: 20 TABLET | Refills: 0 | Status: SHIPPED | OUTPATIENT
Start: 2018-02-16 | End: 2018-11-16 | Stop reason: SDUPTHER

## 2018-03-08 DIAGNOSIS — M79.7 FIBROMYALGIA: ICD-10-CM

## 2018-03-08 RX ORDER — GABAPENTIN 800 MG/1
TABLET ORAL
Qty: 120 TABLET | Refills: 0 | Status: CANCELLED | OUTPATIENT
Start: 2018-03-08

## 2018-03-08 RX ORDER — HALOPERIDOL 1 MG/1
TABLET ORAL
Qty: 60 TABLET | Refills: 0 | Status: CANCELLED | OUTPATIENT
Start: 2018-03-08

## 2018-03-09 DIAGNOSIS — M79.7 FIBROMYALGIA: ICD-10-CM

## 2018-03-09 RX ORDER — GABAPENTIN 800 MG/1
800 TABLET ORAL 4 TIMES DAILY
Qty: 120 TABLET | Refills: 0 | OUTPATIENT
Start: 2018-03-09 | End: 2018-06-04 | Stop reason: SDUPTHER

## 2018-03-09 RX ORDER — HALOPERIDOL 1 MG/1
1 TABLET ORAL 2 TIMES DAILY
Qty: 60 TABLET | Refills: 1 | Status: SHIPPED | OUTPATIENT
Start: 2018-03-09 | End: 2018-06-12

## 2018-03-13 NOTE — TELEPHONE ENCOUNTER
I called in the gabapentin on 3/9/18. I also renewed the haldol at a new dose. Can you check on this please?

## 2018-03-13 NOTE — TELEPHONE ENCOUNTER
Called in patient's TRAMADOL 50 MG, I also checked to see if the patient had picked up her GABAPENTIN AND HALDOL, she picked both up on Sunday 11,2018

## 2018-03-19 RX ORDER — TRAMADOL HYDROCHLORIDE 50 MG/1
TABLET ORAL
Qty: 60 TABLET | Refills: 0 | OUTPATIENT
Start: 2018-03-19 | End: 2018-04-08 | Stop reason: SDUPTHER

## 2018-04-08 DIAGNOSIS — M79.7 FIBROMYALGIA: ICD-10-CM

## 2018-04-10 RX ORDER — TRAMADOL HYDROCHLORIDE 50 MG/1
TABLET ORAL
Qty: 60 TABLET | Refills: 0 | Status: SHIPPED | OUTPATIENT
Start: 2018-04-10 | End: 2018-05-07 | Stop reason: SDUPTHER

## 2018-04-12 ENCOUNTER — OFFICE VISIT (OUTPATIENT)
Dept: FAMILY MEDICINE CLINIC | Facility: CLINIC | Age: 43
End: 2018-04-12

## 2018-04-12 VITALS
TEMPERATURE: 99.1 F | HEART RATE: 124 BPM | BODY MASS INDEX: 30.12 KG/M2 | WEIGHT: 170 LBS | HEIGHT: 63 IN | DIASTOLIC BLOOD PRESSURE: 82 MMHG | SYSTOLIC BLOOD PRESSURE: 94 MMHG | OXYGEN SATURATION: 97 %

## 2018-04-12 DIAGNOSIS — D64.9 ANEMIA, UNSPECIFIED TYPE: ICD-10-CM

## 2018-04-12 DIAGNOSIS — E46 MALNUTRITION, UNSPECIFIED TYPE (HCC): ICD-10-CM

## 2018-04-12 DIAGNOSIS — G43.909 MIGRAINE WITHOUT STATUS MIGRAINOSUS, NOT INTRACTABLE, UNSPECIFIED MIGRAINE TYPE: ICD-10-CM

## 2018-04-12 DIAGNOSIS — R11.2 NAUSEA AND VOMITING, INTRACTABILITY OF VOMITING NOT SPECIFIED, UNSPECIFIED VOMITING TYPE: ICD-10-CM

## 2018-04-12 DIAGNOSIS — E86.0 DEHYDRATION: Primary | ICD-10-CM

## 2018-04-12 DIAGNOSIS — M32.9 SYSTEMIC LUPUS ERYTHEMATOSUS, UNSPECIFIED SLE TYPE, UNSPECIFIED ORGAN INVOLVEMENT STATUS (HCC): ICD-10-CM

## 2018-04-12 PROCEDURE — 99214 OFFICE O/P EST MOD 30 MIN: CPT | Performed by: FAMILY MEDICINE

## 2018-04-12 RX ORDER — PREDNISONE 10 MG/1
10 TABLET ORAL DAILY
Status: CANCELLED | OUTPATIENT
Start: 2018-04-12

## 2018-04-12 NOTE — PROGRESS NOTES
Subjective   Charline Cook is a 42 y.o. female.     Chief Complaint   Patient presents with   • Follow-up     medication and new symptoms        History of Present Illness    Lupus flare  Has previously been seen by neurology given her symptoms somewhat inclusive of MS. Does not see neurology for long time now.  This flare is different because her heart rate is elevated more than usual and her hands and feet are numb. Seen by Dr. Heineke with rheum but he retired 6 months ago and she was taken off of plaquenil because of significant vision changes so managed by prior PCP. Did steroids for flares. Pt has significant nausea, vomiting and dry heaves. Feels as though she is dehydrated. Has never been in a wheelchair before but came in one today because she will be too tired by all the walking required to come to the appointment, fear of passing out because she gets lightheaded when standing, and pain in her feet that is burning and worse when she puts pressure on them.    Has been tapering the haldol, unclear why she is on this medication and does not know that it has been helping her in anyway. Her pain is bad she take ibuprofen 800 mg TID but does not do this routinely. Avoids more potent pain medication because of the significant side effects she experiences. Takes her tramadol twice daily.    Insomnia  Tried amitriptyline for one month but did not help. Has never been a good sleeper.    Upon chart review note from Dr. Angie Carmona from 10/2013 with neurology to whom she was referred for evaluation of possible MS. Was having memory loss, emotional lability, headaches, neck pain, joint pain. Describes that pt was admitted to [facility] after became weak and collapsed. Had low BG and electrolyte imbalance. Cards evaluated her with tilt table that pt reported was positive, said she was dx with POTS. She had been doing a lot of passing out episodes 3x/wk for 2 months. Note described lupus as questionable and that work up  was negative including UA x3 and kidney U/S. Note goes EBV starting at age 17 years, and since has had intermittent neurologic complaints, pain, weakness, fatigue. Migraines since age 15 years, a variant with periodic episodes of syncope. In 2012 had several months with sudden onset joint pain, weakness, fatigue (symptoms worse anytime she has to have surgery or has an infection) and referred to rheum, treated for seronegative lupus with plaquenil and methotrexate. Methotrexate d/c'd by pt for oral ulcer right before seeing neuro.    Of note, by Angie Carmona'sAPRN documentation pt had normal visual field testing and pt reports significant peripheral vision loss warranting doctor's to be concerned about pituitary tumor in 2013 but had head imaging without lesion, per pt and confirmed in Kristine's note. Per her review of MRI brain +/- contrast from 9/26/13 at [facility] was normal. Labs: normal hepatitis panel, neg LAN, neg ds-DNA, normal RF, normal SSA, SSB, SCL 70, APA panel, cardiolipin B Ab, CMP, ESR, CRP, CBC. She had LP, MRI C and T spine, PT, speech, cards referral. She had EKG, stress test taht was overall normal without evidence for ischemia, normal EF/systolic function. She also had holter monitor showing predominant rhythm of sinus, no PVCs, there were >1600 APCs. When evaluated by Dr. Benji [facility] and dx with POTS and or vasovagal syncope, advised to wear compression stockings, aggressive hydration and sodium supplementation.    The following portions of the patient's history were reviewed and updated as appropriate: allergies, current medications, past family history, past medical history and problem list.      Review of Systems   Constitutional: Positive for activity change, appetite change and fatigue. Negative for chills, fever and unexpected weight change.   HENT: Negative for congestion and rhinorrhea.    Respiratory: Negative for cough.   "  Gastrointestinal: Positive for abdominal pain, nausea and vomiting. Negative for constipation and diarrhea.   Musculoskeletal: Positive for arthralgias and gait problem.   Neurological: Positive for numbness.   Psychiatric/Behavioral: Positive for sleep disturbance.       Objective   Blood pressure 94/82, pulse (!) 124, temperature 99.1 °F (37.3 °C), height 158.8 cm (62.5\"), weight 77.1 kg (170 lb), SpO2 97 %, not currently breastfeeding.  Physical Exam   Constitutional: She is oriented to person, place, and time. She appears well-nourished. No distress.   HENT:   Right Ear: External ear normal.   Left Ear: External ear normal.   Nose: Nose normal.   Mouth/Throat: Oropharynx is clear and moist.   Membranes with mild dryness/tacky   Eyes: Conjunctivae are normal. Right eye exhibits no discharge. Left eye exhibits no discharge. No scleral icterus.   Cardiovascular: Normal rate, regular rhythm, normal heart sounds and intact distal pulses.    No murmur heard.  Pulmonary/Chest: Effort normal and breath sounds normal. No respiratory distress. She has no wheezes.   Musculoskeletal: She exhibits no edema.   Neurological: She is alert and oriented to person, place, and time. She exhibits normal muscle tone.   BUE and BLE strength testing inconsistent. Moments she has 5/5 strength in all extremities then will decrease and strengthen, fluctuated. Normal sensation in B feet.   Skin: Skin is dry. No erythema.   Psychiatric: She has a normal mood and affect. Her behavior is normal.   Vitals reviewed.      Assessment/Plan   Charline was seen today for follow-up.    Diagnoses and all orders for this visit:    Dehydration  -     Comprehensive Metabolic Panel    Systemic lupus erythematosus, unspecified SLE type, unspecified organ involvement status  -     Comprehensive Metabolic Panel    Migraine without status migrainosus, not intractable, unspecified migraine type    Nausea and vomiting, intractability of vomiting not specified, " "unspecified vomiting type  -     Comprehensive Metabolic Panel    Malnutrition, unspecified type  -     Comprehensive Metabolic Panel  -     Vitamin B12    Anemia, unspecified type  -     CBC & Differential  -     Vitamin B12    Other orders  -     Cancel: predniSONE (DELTASONE) 10 MG tablet; Take 1 tablet by mouth Daily.    labs will dictate plan. Pt report concerning for dehydration and potential for electrolyte derangements but looks well overall. With electrolyte abnormalities will recommend she go to ER/admit, if labs look good will do course of steroids and refer to new rheumatology. Given chart review the underlying etiology for her recurring symptoms, \"flares\" are unclear.           "

## 2018-04-13 LAB
ALBUMIN SERPL-MCNC: 4.2 G/DL (ref 3.5–5.2)
ALBUMIN/GLOB SERPL: 1.4 G/DL
ALP SERPL-CCNC: 119 U/L (ref 39–117)
ALT SERPL-CCNC: 25 U/L (ref 1–33)
AST SERPL-CCNC: 19 U/L (ref 1–32)
BASOPHILS # BLD AUTO: 0.06 10*3/MM3 (ref 0–0.2)
BASOPHILS NFR BLD AUTO: 1.4 % (ref 0–1.5)
BILIRUB SERPL-MCNC: 0.2 MG/DL (ref 0.1–1.2)
BUN SERPL-MCNC: 8 MG/DL (ref 6–20)
BUN/CREAT SERPL: 10.7 (ref 7–25)
CALCIUM SERPL-MCNC: 9 MG/DL (ref 8.6–10.5)
CHLORIDE SERPL-SCNC: 102 MMOL/L (ref 98–107)
CO2 SERPL-SCNC: 25.3 MMOL/L (ref 22–29)
CREAT SERPL-MCNC: 0.75 MG/DL (ref 0.57–1)
DIFFERENTIAL COMMENT: NORMAL
EOSINOPHIL # BLD AUTO: 0.15 10*3/MM3 (ref 0–0.7)
EOSINOPHIL NFR BLD AUTO: 3.5 % (ref 0.3–6.2)
ERYTHROCYTE [DISTWIDTH] IN BLOOD BY AUTOMATED COUNT: 17.8 % (ref 11.7–13)
GFR SERPLBLD CREATININE-BSD FMLA CKD-EPI: 102 ML/MIN/1.73
GFR SERPLBLD CREATININE-BSD FMLA CKD-EPI: 84 ML/MIN/1.73
GLOBULIN SER CALC-MCNC: 3 GM/DL
GLUCOSE SERPL-MCNC: 86 MG/DL (ref 65–99)
HCT VFR BLD AUTO: 33.2 % (ref 35.6–45.5)
HGB BLD-MCNC: 9.3 G/DL (ref 11.9–15.5)
IMM GRANULOCYTES # BLD: 0 10*3/MM3 (ref 0–0.03)
IMM GRANULOCYTES NFR BLD: 0 % (ref 0–0.5)
LYMPHOCYTES # BLD AUTO: 1.52 10*3/MM3 (ref 0.9–4.8)
LYMPHOCYTES NFR BLD AUTO: 35.2 % (ref 19.6–45.3)
MCH RBC QN AUTO: 27 PG (ref 26.9–32)
MCHC RBC AUTO-ENTMCNC: 28 G/DL (ref 32.4–36.3)
MCV RBC AUTO: 96.2 FL (ref 80.5–98.2)
MONOCYTES # BLD AUTO: 1.08 10*3/MM3 (ref 0.2–1.2)
MONOCYTES NFR BLD AUTO: 25 % (ref 5–12)
NEUTROPHILS # BLD AUTO: 1.51 10*3/MM3 (ref 1.9–8.1)
NEUTROPHILS NFR BLD AUTO: 34.9 % (ref 42.7–76)
PLATELET # BLD AUTO: 397 10*3/MM3 (ref 140–500)
PLATELET BLD QL SMEAR: NORMAL
POTASSIUM SERPL-SCNC: 4.2 MMOL/L (ref 3.5–5.2)
PROT SERPL-MCNC: 7.2 G/DL (ref 6–8.5)
RBC # BLD AUTO: 3.45 10*6/MM3 (ref 3.9–5.2)
RBC MORPH BLD: NORMAL
SODIUM SERPL-SCNC: 143 MMOL/L (ref 136–145)
VIT B12 SERPL-MCNC: 213 PG/ML (ref 211–946)
WBC # BLD AUTO: 4.32 10*3/MM3 (ref 4.5–10.7)

## 2018-04-13 RX ORDER — PREDNISONE 10 MG/1
TABLET ORAL
Qty: 77 TABLET | Refills: 0 | Status: SHIPPED | OUTPATIENT
Start: 2018-04-13 | End: 2018-06-12

## 2018-04-16 ENCOUNTER — TELEPHONE (OUTPATIENT)
Dept: FAMILY MEDICINE CLINIC | Facility: CLINIC | Age: 43
End: 2018-04-16

## 2018-04-16 NOTE — TELEPHONE ENCOUNTER
Called and spoke with pt about her lab results. Her electrolytes are normal despite the report of continuous nausea and significant vomiting. Will go ahead and order course of steroids for potential for SLE flare. Advised that we get another rheumatologist on board for evaluating and treating. Pt is agreeable to this plan. To seek further medical help over the weekend if condition worsens. Pt voiced understanding.

## 2018-04-17 PROBLEM — R63.4 WEIGHT LOSS: Status: ACTIVE | Noted: 2018-04-17

## 2018-04-17 PROBLEM — R11.2 SEVERE NAUSEA AND VOMITING THAT HAS LASTED A LONG TIME: Status: ACTIVE | Noted: 2018-04-17

## 2018-05-07 DIAGNOSIS — M79.7 FIBROMYALGIA: ICD-10-CM

## 2018-05-09 RX ORDER — TRAMADOL HYDROCHLORIDE 50 MG/1
TABLET ORAL
Qty: 60 TABLET | Refills: 0 | Status: SHIPPED | OUTPATIENT
Start: 2018-05-09 | End: 2018-06-07 | Stop reason: SDUPTHER

## 2018-05-15 ENCOUNTER — TELEPHONE (OUTPATIENT)
Dept: FAMILY MEDICINE CLINIC | Facility: CLINIC | Age: 43
End: 2018-05-15

## 2018-05-15 NOTE — TELEPHONE ENCOUNTER
Patient called stating she has not been sleeping and wants a prescription of trazodone sent to pharmacy as well as her migraine medication

## 2018-05-16 DIAGNOSIS — G43.009 MIGRAINE WITHOUT AURA AND WITHOUT STATUS MIGRAINOSUS, NOT INTRACTABLE: ICD-10-CM

## 2018-05-16 RX ORDER — RIZATRIPTAN BENZOATE 10 MG/1
10 TABLET, ORALLY DISINTEGRATING ORAL ONCE AS NEEDED
Qty: 12 TABLET | Refills: 2 | Status: SHIPPED | OUTPATIENT
Start: 2018-05-16 | End: 2018-12-20 | Stop reason: SDUPTHER

## 2018-06-04 DIAGNOSIS — M79.7 FIBROMYALGIA: ICD-10-CM

## 2018-06-05 DIAGNOSIS — F41.9 ANXIETY: ICD-10-CM

## 2018-06-05 RX ORDER — DULOXETIN HYDROCHLORIDE 60 MG/1
60 CAPSULE, DELAYED RELEASE ORAL DAILY
Qty: 30 CAPSULE | Refills: 2 | Status: SHIPPED | OUTPATIENT
Start: 2018-06-05 | End: 2018-08-30 | Stop reason: SDUPTHER

## 2018-06-06 RX ORDER — GABAPENTIN 800 MG/1
TABLET ORAL
Qty: 120 TABLET | Refills: 0 | Status: SHIPPED | OUTPATIENT
Start: 2018-06-06 | End: 2018-07-04 | Stop reason: SDUPTHER

## 2018-06-07 DIAGNOSIS — M79.7 FIBROMYALGIA: ICD-10-CM

## 2018-06-08 RX ORDER — TRAMADOL HYDROCHLORIDE 50 MG/1
TABLET ORAL
Qty: 60 TABLET | Refills: 0 | Status: SHIPPED | OUTPATIENT
Start: 2018-06-08 | End: 2018-07-07 | Stop reason: SDUPTHER

## 2018-06-12 ENCOUNTER — OFFICE VISIT (OUTPATIENT)
Dept: FAMILY MEDICINE CLINIC | Facility: CLINIC | Age: 43
End: 2018-06-12

## 2018-06-12 VITALS
WEIGHT: 161 LBS | OXYGEN SATURATION: 98 % | BODY MASS INDEX: 28.53 KG/M2 | HEIGHT: 63 IN | SYSTOLIC BLOOD PRESSURE: 118 MMHG | DIASTOLIC BLOOD PRESSURE: 78 MMHG | HEART RATE: 122 BPM

## 2018-06-12 DIAGNOSIS — R11.2 NON-INTRACTABLE VOMITING WITH NAUSEA, UNSPECIFIED VOMITING TYPE: Primary | ICD-10-CM

## 2018-06-12 DIAGNOSIS — M32.9 SYSTEMIC LUPUS ERYTHEMATOSUS, UNSPECIFIED SLE TYPE, UNSPECIFIED ORGAN INVOLVEMENT STATUS (HCC): ICD-10-CM

## 2018-06-12 DIAGNOSIS — R20.2 NUMBNESS AND TINGLING OF BOTH LEGS BELOW KNEES: ICD-10-CM

## 2018-06-12 DIAGNOSIS — R20.0 NUMBNESS AND TINGLING OF BOTH LEGS BELOW KNEES: ICD-10-CM

## 2018-06-12 DIAGNOSIS — D64.9 ANEMIA, UNSPECIFIED TYPE: ICD-10-CM

## 2018-06-12 PROCEDURE — 99214 OFFICE O/P EST MOD 30 MIN: CPT | Performed by: FAMILY MEDICINE

## 2018-06-12 RX ORDER — PROMETHAZINE HYDROCHLORIDE 25 MG/1
25 TABLET ORAL EVERY 6 HOURS PRN
Qty: 24 TABLET | Refills: 3 | Status: SHIPPED | OUTPATIENT
Start: 2018-06-12 | End: 2018-08-01 | Stop reason: SDUPTHER

## 2018-06-12 RX ORDER — TRAZODONE HYDROCHLORIDE 50 MG/1
50 TABLET ORAL NIGHTLY
Qty: 30 TABLET | Refills: 1 | Status: SHIPPED | OUTPATIENT
Start: 2018-06-12 | End: 2018-08-01 | Stop reason: SDUPTHER

## 2018-06-12 NOTE — PROGRESS NOTES
Subjective   Charline Cook is a 42 y.o. female.     Chief Complaint   Patient presents with   • Dizziness   • Vomiting   • Numbness     legs, feet,hands        History of Present Illness  Vomiting  Not eating very much. Trying to eat more plant based diet for relief. Can't keep down. Trying watermelon and cucumbers. Drinking slushies but doesn't drink much, few oz here and there. Mostly drinking Mt. Dew and Sprite. Gets about 8 oz of Mt. Dew. Not taking zofran regularly because does not seem to help. Tolerated dinner yesterday. No blood in the vomit.  Foamy vomit, yellow. She has lost weight according to the vitals records. Nine lbs in 2 months.     Numbness  Legs are a trigger. When she stands her legs get so tired it makes her sick. Standing or walking minimally. If she stands for about 30 seconds she gets sensation in her calves and can walk down the stairs and to the back door and she feels sick. This has been going on for about 3-4 months now. Numb and pins and needles sensation, she has pain with this and it goes from her feet to her knees. Both legs and just woke up one morning with them like this.     Will have a good day or 2 at the max but then back with symptoms. She is using a wheelchair when she goes out of the house because of the legs. Keeping medications down for the most part. She is off the haldol for about 2 months. Irritable for two weeks and more anxiety but this all resolved. She has about 12 per month of her migraines.     Took the steroids and felt better for about 2 weeks. Joints are not bothering her as much right know. She was having to take iburprofen regularly but not right now.   Trouble sleeping. Feels like it is related to her legs moving feels like she has RLS, kicks. Sometimes hard fast, sometimes over and over again. Feels like it wakes her up but also says her legs are numb so she doesn't feel herself doing it.     The following portions of the patient's history were reviewed and  "updated as appropriate: allergies, current medications and problem list.    Review of Systems   Constitutional: Positive for activity change, appetite change and unexpected weight change.   Respiratory: Negative for cough and shortness of breath.    Gastrointestinal: Positive for abdominal pain, diarrhea, nausea and vomiting. Negative for blood in stool and constipation.   Musculoskeletal: Positive for gait problem. Negative for arthralgias, back pain and joint swelling.   Neurological: Positive for dizziness, numbness and headaches. Negative for syncope and weakness.   Psychiatric/Behavioral: Positive for sleep disturbance. The patient is nervous/anxious.        Objective   Blood pressure 118/78, pulse (!) 122, height 158.8 cm (62.5\"), weight 73 kg (161 lb), SpO2 98 %, not currently breastfeeding.  Physical Exam   Constitutional: She is oriented to person, place, and time.   HENT:   Mouth sounds tachy but not dry on exam.   Eyes: Conjunctivae are normal. Right eye exhibits no discharge. Left eye exhibits no discharge.   Cardiovascular: Regular rhythm and normal heart sounds.  Exam reveals no gallop and no friction rub.    No murmur heard.  tachycardic   Pulmonary/Chest: Effort normal and breath sounds normal. No respiratory distress. She has no wheezes.   Abdominal: Soft. Bowel sounds are normal. She exhibits no distension. There is tenderness.   Mild RUQ tenderness   Musculoskeletal: She exhibits tenderness. She exhibits no edema or deformity.   Neurological: She is alert and oriented to person, place, and time. She displays normal reflexes. She exhibits normal muscle tone. Coordination normal.   5/5 strength in BLE, no clonus, +Babinski with down going 2-5th digit bilaterally.    Skin: Skin is warm. No erythema.   Psychiatric: She has a normal mood and affect. Her behavior is normal.       Assessment/Plan   Charline was seen today for dizziness, vomiting and numbness.    Diagnoses and all orders for this " visit:    Non-intractable vomiting with nausea, unspecified vomiting type  -     TSH Rfx On Abnormal To Free T4  -     Basic Metabolic Panel    Numbness and tingling of both legs below knees  -     TSH Rfx On Abnormal To Free T4  -     LAN  -     Anti-DNA Antibody, Double-stranded  -     Basic Metabolic Panel  -     Ambulatory Referral to Neurology    Anemia, unspecified type  -     TSH Rfx On Abnormal To Free T4  -     Iron and TIBC  -     Ferritin  -     LAN  -     Anti-DNA Antibody, Double-stranded    Systemic lupus erythematosus, unspecified SLE type, unspecified organ involvement status  -     Sedimentation Rate  -     C-reactive protein    Other orders  -     traZODone (DESYREL) 50 MG tablet; Take 1 tablet by mouth Every Night.  -     promethazine (PHENERGAN) 25 MG tablet; Take 1 tablet by mouth Every 6 (Six) Hours As Needed for Nausea or Vomiting.      Unclear etiology of pt's symptoms and their chronicity. Pt appears well, she laughs and smiles throughout the appointment. Has previously been seen by rheumatology but has been years. Seen by MS neurologist. Will refer to neurology given her peripheral neuropathy, blood sugars have been normal on all checks. Will see if we can get a rheum appointment prior to September. Labs today for further evaluation. Usually has joint pain but has improved since steroids.

## 2018-06-13 LAB
ANA SER QL: NEGATIVE
BUN SERPL-MCNC: 8 MG/DL (ref 6–20)
BUN/CREAT SERPL: 11.8 (ref 7–25)
CALCIUM SERPL-MCNC: 10 MG/DL (ref 8.6–10.5)
CHLORIDE SERPL-SCNC: 98 MMOL/L (ref 98–107)
CO2 SERPL-SCNC: 29.2 MMOL/L (ref 22–29)
CREAT SERPL-MCNC: 0.68 MG/DL (ref 0.57–1)
CRP SERPL-MCNC: 0.91 MG/DL (ref 0–0.5)
DSDNA AB SER-ACNC: <1 IU/ML (ref 0–9)
ERYTHROCYTE [SEDIMENTATION RATE] IN BLOOD BY WESTERGREN METHOD: 48 MM/HR (ref 0–20)
FERRITIN SERPL-MCNC: 225.5 NG/ML (ref 13–150)
GFR SERPLBLD CREATININE-BSD FMLA CKD-EPI: 114 ML/MIN/1.73
GFR SERPLBLD CREATININE-BSD FMLA CKD-EPI: 94 ML/MIN/1.73
GLUCOSE SERPL-MCNC: 101 MG/DL (ref 65–99)
IRON SATN MFR SERPL: 3 % (ref 20–50)
IRON SERPL-MCNC: 15 MCG/DL (ref 37–145)
POTASSIUM SERPL-SCNC: 3.7 MMOL/L (ref 3.5–5.2)
SODIUM SERPL-SCNC: 140 MMOL/L (ref 136–145)
TIBC SERPL-MCNC: 431 MCG/DL
TSH SERPL DL<=0.005 MIU/L-ACNC: 1.95 MIU/ML (ref 0.27–4.2)
UIBC SERPL-MCNC: 416 MCG/DL

## 2018-07-04 DIAGNOSIS — M79.7 FIBROMYALGIA: ICD-10-CM

## 2018-07-05 RX ORDER — VENLAFAXINE 75 MG/1
225 TABLET ORAL DAILY
Qty: 90 TABLET | Refills: 0 | Status: SHIPPED | OUTPATIENT
Start: 2018-07-05 | End: 2018-08-01 | Stop reason: SDUPTHER

## 2018-07-05 RX ORDER — GABAPENTIN 800 MG/1
TABLET ORAL
Qty: 120 TABLET | Refills: 0 | Status: SHIPPED | OUTPATIENT
Start: 2018-07-05 | End: 2018-08-01 | Stop reason: SDUPTHER

## 2018-07-07 DIAGNOSIS — M79.7 FIBROMYALGIA: ICD-10-CM

## 2018-07-10 RX ORDER — TRAMADOL HYDROCHLORIDE 50 MG/1
TABLET ORAL
Qty: 60 TABLET | Refills: 0 | Status: SHIPPED | OUTPATIENT
Start: 2018-07-10 | End: 2018-08-07 | Stop reason: SDUPTHER

## 2018-08-01 DIAGNOSIS — M79.7 FIBROMYALGIA: ICD-10-CM

## 2018-08-01 RX ORDER — GABAPENTIN 800 MG/1
TABLET ORAL
Qty: 120 TABLET | Refills: 0 | Status: SHIPPED | OUTPATIENT
Start: 2018-08-01 | End: 2018-08-30 | Stop reason: SDUPTHER

## 2018-08-01 RX ORDER — TRAZODONE HYDROCHLORIDE 50 MG/1
50 TABLET ORAL NIGHTLY
Qty: 30 TABLET | Refills: 0 | Status: SHIPPED | OUTPATIENT
Start: 2018-08-01 | End: 2018-08-22 | Stop reason: SDUPTHER

## 2018-08-01 RX ORDER — PROMETHAZINE HYDROCHLORIDE 25 MG/1
TABLET ORAL
Qty: 24 TABLET | Refills: 0 | Status: SHIPPED | OUTPATIENT
Start: 2018-08-01 | End: 2018-08-28 | Stop reason: SDUPTHER

## 2018-08-01 RX ORDER — VENLAFAXINE 75 MG/1
TABLET ORAL
Qty: 90 TABLET | Refills: 0 | Status: SHIPPED | OUTPATIENT
Start: 2018-08-01 | End: 2018-08-30 | Stop reason: SDUPTHER

## 2018-08-07 DIAGNOSIS — M79.7 FIBROMYALGIA: ICD-10-CM

## 2018-08-08 RX ORDER — TRAMADOL HYDROCHLORIDE 50 MG/1
TABLET ORAL
Qty: 60 TABLET | Refills: 0 | Status: SHIPPED | OUTPATIENT
Start: 2018-08-08 | End: 2018-09-07 | Stop reason: SDUPTHER

## 2018-08-21 ENCOUNTER — OFFICE VISIT (OUTPATIENT)
Dept: FAMILY MEDICINE CLINIC | Facility: CLINIC | Age: 43
End: 2018-08-21

## 2018-08-21 VITALS
DIASTOLIC BLOOD PRESSURE: 70 MMHG | BODY MASS INDEX: 28 KG/M2 | SYSTOLIC BLOOD PRESSURE: 132 MMHG | OXYGEN SATURATION: 99 % | WEIGHT: 158 LBS | HEART RATE: 129 BPM | HEIGHT: 63 IN

## 2018-08-21 DIAGNOSIS — R20.0 NUMBNESS AND TINGLING OF BOTH LOWER EXTREMITIES: ICD-10-CM

## 2018-08-21 DIAGNOSIS — R13.19 ESOPHAGEAL DYSPHAGIA: ICD-10-CM

## 2018-08-21 DIAGNOSIS — R29.898 WEAKNESS OF BOTH LOWER EXTREMITIES: ICD-10-CM

## 2018-08-21 DIAGNOSIS — G43.009 MIGRAINE WITHOUT AURA AND WITHOUT STATUS MIGRAINOSUS, NOT INTRACTABLE: ICD-10-CM

## 2018-08-21 DIAGNOSIS — R29.2 HYPERREFLEXIA: ICD-10-CM

## 2018-08-21 DIAGNOSIS — R63.4 WEIGHT LOSS: ICD-10-CM

## 2018-08-21 DIAGNOSIS — F51.01 PRIMARY INSOMNIA: ICD-10-CM

## 2018-08-21 DIAGNOSIS — R11.2 SEVERE NAUSEA AND VOMITING THAT HAS LASTED A LONG TIME: Primary | ICD-10-CM

## 2018-08-21 DIAGNOSIS — R20.2 NUMBNESS AND TINGLING OF BOTH LOWER EXTREMITIES: ICD-10-CM

## 2018-08-21 PROCEDURE — 99214 OFFICE O/P EST MOD 30 MIN: CPT | Performed by: FAMILY MEDICINE

## 2018-08-22 ENCOUNTER — TELEPHONE (OUTPATIENT)
Dept: FAMILY MEDICINE CLINIC | Facility: CLINIC | Age: 43
End: 2018-08-22

## 2018-08-22 RX ORDER — TRAZODONE HYDROCHLORIDE 100 MG/1
100 TABLET ORAL NIGHTLY
Qty: 90 TABLET | Refills: 0 | Status: SHIPPED | OUTPATIENT
Start: 2018-08-22 | End: 2018-11-09 | Stop reason: SDUPTHER

## 2018-08-22 NOTE — TELEPHONE ENCOUNTER
Patient called stating that she didnt know she was that low on her trazodone and will need that new increase dose prescription sent to her pharmacy

## 2018-08-30 DIAGNOSIS — F41.9 ANXIETY: ICD-10-CM

## 2018-08-30 DIAGNOSIS — M79.7 FIBROMYALGIA: ICD-10-CM

## 2018-08-31 RX ORDER — VENLAFAXINE 75 MG/1
TABLET ORAL
Qty: 90 TABLET | Refills: 0 | Status: SHIPPED | OUTPATIENT
Start: 2018-08-31 | End: 2018-11-20 | Stop reason: SDUPTHER

## 2018-08-31 RX ORDER — GABAPENTIN 800 MG/1
TABLET ORAL
Qty: 120 TABLET | Refills: 0 | Status: SHIPPED | OUTPATIENT
Start: 2018-08-31 | End: 2018-09-10 | Stop reason: SDUPTHER

## 2018-08-31 RX ORDER — PROMETHAZINE HYDROCHLORIDE 25 MG/1
TABLET ORAL
Qty: 24 TABLET | Refills: 2 | Status: SHIPPED | OUTPATIENT
Start: 2018-08-31 | End: 2019-01-30

## 2018-08-31 RX ORDER — BENZONATATE 200 MG/1
CAPSULE ORAL
Qty: 60 CAPSULE | Refills: 0 | OUTPATIENT
Start: 2018-08-31

## 2018-08-31 RX ORDER — DULOXETIN HYDROCHLORIDE 60 MG/1
60 CAPSULE, DELAYED RELEASE ORAL DAILY
Qty: 30 CAPSULE | Refills: 0 | Status: SHIPPED | OUTPATIENT
Start: 2018-08-31 | End: 2018-11-20 | Stop reason: SDUPTHER

## 2018-09-07 DIAGNOSIS — M79.7 FIBROMYALGIA: ICD-10-CM

## 2018-09-10 ENCOUNTER — TELEPHONE (OUTPATIENT)
Dept: FAMILY MEDICINE CLINIC | Facility: CLINIC | Age: 43
End: 2018-09-10

## 2018-09-10 DIAGNOSIS — M79.7 FIBROMYALGIA: ICD-10-CM

## 2018-09-10 RX ORDER — GABAPENTIN 800 MG/1
800 TABLET ORAL 4 TIMES DAILY
Qty: 24 TABLET | Refills: 0 | Status: SHIPPED | OUTPATIENT
Start: 2018-09-10 | End: 2018-11-09 | Stop reason: SDUPTHER

## 2018-09-10 NOTE — TELEPHONE ENCOUNTER
Patient called stating that she had to leave for a family emergency on Thursday and did not  her  gabapentin and the pharmacy will not transfer the medication due to it being a controlled substance. Patient is still in [place] right now, states she has been without the medication for a day or two

## 2018-09-10 NOTE — TELEPHONE ENCOUNTER
Can we just call in a few for [place]? I would be ok with that, not a full prescription but an amount reasonable for her stay there. Would you call and get details for her local pharmacy in [place]?

## 2018-09-12 DIAGNOSIS — M79.7 FIBROMYALGIA: ICD-10-CM

## 2018-09-14 RX ORDER — TRAMADOL HYDROCHLORIDE 50 MG/1
TABLET ORAL
Qty: 60 TABLET | Refills: 0 | OUTPATIENT
Start: 2018-09-14

## 2018-09-14 RX ORDER — TRAMADOL HYDROCHLORIDE 50 MG/1
TABLET ORAL
Qty: 60 TABLET | Refills: 0 | Status: SHIPPED | OUTPATIENT
Start: 2018-09-14 | End: 2018-10-08 | Stop reason: SDUPTHER

## 2018-10-08 DIAGNOSIS — M79.7 FIBROMYALGIA: ICD-10-CM

## 2018-10-09 DIAGNOSIS — M79.7 FIBROMYALGIA: ICD-10-CM

## 2018-10-10 RX ORDER — TRAMADOL HYDROCHLORIDE 50 MG/1
TABLET ORAL
Qty: 60 TABLET | Refills: 0 | Status: SHIPPED | OUTPATIENT
Start: 2018-10-10 | End: 2018-11-09

## 2018-10-10 RX ORDER — GABAPENTIN 800 MG/1
TABLET ORAL
Qty: 120 TABLET | Refills: 0 | Status: SHIPPED | OUTPATIENT
Start: 2018-10-10 | End: 2018-11-09 | Stop reason: SDUPTHER

## 2018-10-15 ENCOUNTER — TELEPHONE (OUTPATIENT)
Dept: FAMILY MEDICINE CLINIC | Facility: CLINIC | Age: 43
End: 2018-10-15

## 2018-10-15 NOTE — TELEPHONE ENCOUNTER
Pt  called to cancel her upcoming Appt w Dr Cooper.  Pt  states she is in a Physical Rehab Facility.

## 2018-10-26 ENCOUNTER — TELEPHONE (OUTPATIENT)
Dept: FAMILY MEDICINE CLINIC | Facility: CLINIC | Age: 43
End: 2018-10-26

## 2018-10-26 NOTE — TELEPHONE ENCOUNTER
PT JUST WAS RELEASED FROM PHYSICAL REHAB AFTER 21 DAYS, SHE NEEDS TO SEE DR. DOOLEY CAN WE WORK HER IN ONE DAY, SHE CAN DO ANYTHING BUT 11/8/18 CAUSE [place] TUBE PUT IN

## 2018-11-09 ENCOUNTER — OFFICE VISIT (OUTPATIENT)
Dept: FAMILY MEDICINE CLINIC | Facility: CLINIC | Age: 43
End: 2018-11-09

## 2018-11-09 VITALS
HEIGHT: 63 IN | OXYGEN SATURATION: 99 % | BODY MASS INDEX: 25.87 KG/M2 | WEIGHT: 146 LBS | SYSTOLIC BLOOD PRESSURE: 128 MMHG | DIASTOLIC BLOOD PRESSURE: 72 MMHG | HEART RATE: 98 BPM

## 2018-11-09 DIAGNOSIS — M79.7 FIBROMYALGIA: ICD-10-CM

## 2018-11-09 DIAGNOSIS — E61.0 COPPER DEFICIENCY MYELONEUROPATHY (HCC): ICD-10-CM

## 2018-11-09 DIAGNOSIS — E55.9 VITAMIN D DEFICIENCY: ICD-10-CM

## 2018-11-09 DIAGNOSIS — K31.84 GASTROPARESIS: ICD-10-CM

## 2018-11-09 DIAGNOSIS — G99.2 COPPER DEFICIENCY MYELONEUROPATHY (HCC): ICD-10-CM

## 2018-11-09 DIAGNOSIS — G63 COPPER DEFICIENCY MYELONEUROPATHY (HCC): ICD-10-CM

## 2018-11-09 DIAGNOSIS — D51.3 OTHER DIETARY VITAMIN B12 DEFICIENCY ANEMIA: Primary | ICD-10-CM

## 2018-11-09 PROCEDURE — 96372 THER/PROPH/DIAG INJ SC/IM: CPT | Performed by: FAMILY MEDICINE

## 2018-11-09 PROCEDURE — 99214 OFFICE O/P EST MOD 30 MIN: CPT | Performed by: FAMILY MEDICINE

## 2018-11-09 RX ORDER — FOLIC ACID 1 MG/1
1 TABLET ORAL DAILY
COMMUNITY
Start: 2018-09-29 | End: 2019-06-28

## 2018-11-09 RX ORDER — ERGOCALCIFEROL 1.25 MG/1
50000 CAPSULE ORAL WEEKLY
COMMUNITY
Start: 2018-09-28 | End: 2021-02-09 | Stop reason: SDUPTHER

## 2018-11-09 RX ORDER — HYDROCODONE BITARTRATE AND ACETAMINOPHEN 5; 325 MG/1; MG/1
2 TABLET ORAL EVERY 8 HOURS PRN
Qty: 180 TABLET | Refills: 0 | Status: SHIPPED | OUTPATIENT
Start: 2018-11-09 | End: 2019-01-30

## 2018-11-09 RX ORDER — GENTAMICIN SULFATE 590MCG/MG
POWDER (GRAM) MISCELLANEOUS
COMMUNITY
Start: 2018-10-30 | End: 2019-01-03

## 2018-11-09 RX ORDER — ALBUTEROL SULFATE 90 UG/1
2 AEROSOL, METERED RESPIRATORY (INHALATION) EVERY 4 HOURS PRN
Qty: 18 G | Refills: 3 | Status: SHIPPED | OUTPATIENT
Start: 2018-11-09 | End: 2019-09-30 | Stop reason: SDUPTHER

## 2018-11-09 RX ORDER — POTASSIUM CHLORIDE 600 MG/1
CAPSULE, EXTENDED RELEASE ORAL
COMMUNITY
Start: 2018-10-22 | End: 2019-04-09

## 2018-11-09 RX ORDER — HYDROCODONE BITARTRATE AND ACETAMINOPHEN 5; 325 MG/1; MG/1
TABLET ORAL
COMMUNITY
Start: 2018-10-26 | End: 2018-11-09 | Stop reason: SDUPTHER

## 2018-11-09 RX ORDER — TRAZODONE HYDROCHLORIDE 100 MG/1
100 TABLET ORAL NIGHTLY
Qty: 90 TABLET | Refills: 0 | Status: SHIPPED | OUTPATIENT
Start: 2018-11-09 | End: 2018-12-18 | Stop reason: SDUPTHER

## 2018-11-09 RX ORDER — NORTRIPTYLINE HYDROCHLORIDE 25 MG/1
CAPSULE ORAL
COMMUNITY
Start: 2018-10-22 | End: 2018-11-16 | Stop reason: SDUPTHER

## 2018-11-09 RX ORDER — GABAPENTIN 800 MG/1
800 TABLET ORAL 4 TIMES DAILY
Qty: 120 TABLET | Refills: 0 | Status: SHIPPED | OUTPATIENT
Start: 2018-11-09 | End: 2018-12-27 | Stop reason: SDUPTHER

## 2018-11-09 RX ORDER — CYANOCOBALAMIN 1000 UG/ML
1000 INJECTION, SOLUTION INTRAMUSCULAR; SUBCUTANEOUS
Status: DISCONTINUED | OUTPATIENT
Start: 2018-11-09 | End: 2019-01-03

## 2018-11-09 RX ADMIN — CYANOCOBALAMIN 1000 MCG: 1000 INJECTION, SOLUTION INTRAMUSCULAR; SUBCUTANEOUS at 14:24

## 2018-11-09 NOTE — PROGRESS NOTES
Subjective    Chief Complaint   Patient presents with   • HOSPITAL FOLLOW UP   • Med Refill     TRAZODONE        Charline Cook is a 43 y.o. female.     History of Present Illness   BLE weakness related to vitamin deficiency  She has imaging with some B12 deficiency findings. She is taking copper.   Completed IV steroids in patient with no improvement. Received IV copper x 5 doses. Continued on oral copper. Has follow up with neurology for levels.   Recommend Dr. Polk for EMG. Had done.  For the nausea and vomiting related gastroparesis, no pathology noted, also on narcotics, changed from tramadol to hydrocodone. Recommendations for gastroparesis diet and to be seen in the motility clinic. Sees [facility] Dr. Stratton. Going to do stimulator. Has NJ tube in place started yesterday. Going to learn feeds tonight.  Hematology seeing, doing B12 vitamins and to be taking daily folic acid supplementation.     Vitamin D level to be followed here, she is on 50,000 units weekly for 8 weeks.   For her vision field deficits recommended that she sees ophthalmology. Had testing done but no plan at this point. Dr. Kin Loving.    Fell four times, copper was low and then doubled. Going up and down stairs, she has walker not really using it. Fell again. Did great 4 weeks of PT, went to [facility] with  for 1.5 weeks and slid back. Had three days of illness.  Knee pain with patellar bruising, may need surgery.    She had the flu shot    The following portions of the patient's history were reviewed and updated as appropriate: allergies, current medications, past medical history, past social history and problem list.    Review of Systems   Constitutional: Negative.    Eyes: Negative.    Respiratory: Negative.    Cardiovascular: Negative.    Gastrointestinal: Negative.    Endocrine: Negative.    Genitourinary: Negative.    Musculoskeletal: Negative.    Skin: Negative.    Allergic/Immunologic: Negative.    Neurological: Positive for  light-headedness and headaches.   Hematological: Negative.    Psychiatric/Behavioral: Negative.        Objective    Vitals:    11/09/18 1304   BP: 128/72   Pulse: 98   SpO2: 99%       Physical Exam   Constitutional: She is oriented to person, place, and time. She appears well-nourished. No distress.   HENT:   Right Ear: External ear normal.   Left Ear: External ear normal.   Nose: Nose normal.   Mouth/Throat: Oropharynx is clear and moist.   TMs and canals are clear, normal.   Eyes: Conjunctivae are normal. Right eye exhibits no discharge. Left eye exhibits no discharge. No scleral icterus.   Neck: Neck supple. No thyromegaly present.   Cardiovascular: Normal rate, regular rhythm, normal heart sounds and intact distal pulses.   No murmur heard.  Pulmonary/Chest: Effort normal and breath sounds normal. No respiratory distress. She has no wheezes.   Musculoskeletal: She exhibits no edema.   5/5 BLE strength   Lymphadenopathy:     She has no cervical adenopathy.   Neurological: She is alert and oriented to person, place, and time. She exhibits normal muscle tone.   In a wheelchair, normal mentation   Psychiatric: She has a normal mood and affect. Her behavior is normal.   Vitals reviewed.      Assessment/Plan   Charline was seen today for hospital follow up and med refill.    Diagnoses and all orders for this visit:    Other dietary vitamin B12 deficiency anemia  -     CBC & Differential  -     Vitamin B12  -     cyanocobalamin injection 1,000 mcg; Inject 1 mL into the appropriate muscle as directed by prescriber Every 28 (Twenty-Eight) Days.  Pt was getting daily B12 shots in the hospital and now has not had any from discharge. She did not have f/u with hematology. She has medical experience and can give her own injections at home. We will order the 1000 mcg dose injection to her pharmacy to do weekly for one month then monthly. She will get first one today.   Vitamin D deficiency  -     Vitamin D 25 hydroxy  Replacing.  Continue. Check in on level today.  Fibromyalgia  -     gabapentin (NEURONTIN) 800 MG tablet; Take 1 tablet by mouth 4 (Four) Times a Day.  Has been on gabapentin chronically and she does get some relief from this.  She has also been taking tramadol which was discontinued in the hospital and she was placed on hydrocodone.  Reports the hydrocodone has really been helpful for her to get through physical therapy and it also provides her a lot of benefit with the new left knee pain she has related to a patellar injury from multiple falls with her bilateral lower charting weakness.  Copper deficiency myeloneuropathy (CMS/HCC)  Gastroparesis   In addition to helping her chronic pain with fibromyalgia and her new pain with rehabilitation of her bilateral lower extremities and knee injury on the left side requiring most likely surgery, the hydrocodone has also been beneficial for her gastroparesis.  Patient reported she feels like it just calms her belly down and she has been able to experience less nausea and vomiting as a result.  Patient is scheduled to see pain management at [facility] on 12/11/18 with Dr. hCar Parker area I will refill her hydrocodone in the way she is taking it which is 25 mg/325 mg tablets twice daily and sometimes needing a third dose depending on her level of activity for the day or if she has physical therapy.  This is less than what was started in the hospital but given the way she takes it seems appropriate.  I will send this information over to Dr. Parker office for her review in order to keep patient in compliance with any drug testing or review their office requires.  Other orders  -     traZODone (DESYREL) 100 MG tablet; Take 1 tablet by mouth Every Night.  -     HYDROcodone-acetaminophen (NORCO) 5-325 MG per tablet; Take 2 tablets by mouth Every 8 (Eight) Hours As Needed for Moderate Pain .  -     albuterol (PROVENTIL HFA;VENTOLIN HFA) 108 (90 Base) MCG/ACT inhaler;  Inhale 2 puffs Every 4 (Four) Hours As Needed for Wheezing.  -     Cyanocobalamin 1000 MCG/ML kit; Inject 1,000 mcg as directed 1 (One) Time Per Week.    Close follow-up will see the patient in 3 months.      Current outpatient and discharge medications have been reconciled for the patient.  Reviewed by: Yvette Cooper MD

## 2018-11-10 LAB
25(OH)D3+25(OH)D2 SERPL-MCNC: 28.7 NG/ML (ref 30–100)
BASOPHILS # BLD AUTO: 0.05 10*3/MM3 (ref 0–0.2)
BASOPHILS NFR BLD AUTO: 0.5 % (ref 0–1.5)
EOSINOPHIL # BLD AUTO: 0.1 10*3/MM3 (ref 0–0.7)
EOSINOPHIL NFR BLD AUTO: 1 % (ref 0.3–6.2)
ERYTHROCYTE [DISTWIDTH] IN BLOOD BY AUTOMATED COUNT: 15.4 % (ref 11.7–13)
HCT VFR BLD AUTO: 41.3 % (ref 35.6–45.5)
HGB BLD-MCNC: 12.6 G/DL (ref 11.9–15.5)
IMM GRANULOCYTES # BLD: 0.02 10*3/MM3 (ref 0–0.03)
IMM GRANULOCYTES NFR BLD: 0.2 % (ref 0–0.5)
LYMPHOCYTES # BLD AUTO: 1.38 10*3/MM3 (ref 0.9–4.8)
LYMPHOCYTES NFR BLD AUTO: 14.4 % (ref 19.6–45.3)
MCH RBC QN AUTO: 27.2 PG (ref 26.9–32)
MCHC RBC AUTO-ENTMCNC: 30.5 G/DL (ref 32.4–36.3)
MCV RBC AUTO: 89.2 FL (ref 80.5–98.2)
MONOCYTES # BLD AUTO: 0.77 10*3/MM3 (ref 0.2–1.2)
MONOCYTES NFR BLD AUTO: 8.1 % (ref 5–12)
NEUTROPHILS # BLD AUTO: 7.26 10*3/MM3 (ref 1.9–8.1)
NEUTROPHILS NFR BLD AUTO: 76 % (ref 42.7–76)
PLATELET # BLD AUTO: 335 10*3/MM3 (ref 140–500)
RBC # BLD AUTO: 4.63 10*6/MM3 (ref 3.9–5.2)
VIT B12 SERPL-MCNC: >2000 PG/ML (ref 211–946)
WBC # BLD AUTO: 9.56 10*3/MM3 (ref 4.5–10.7)

## 2018-11-13 PROBLEM — K31.84 GASTROPARESIS: Status: ACTIVE | Noted: 2018-11-13

## 2018-11-16 DIAGNOSIS — R11.0 NAUSEA: ICD-10-CM

## 2018-11-16 RX ORDER — NORTRIPTYLINE HYDROCHLORIDE 25 MG/1
25 CAPSULE ORAL NIGHTLY
Qty: 90 CAPSULE | Refills: 1 | Status: SHIPPED | OUTPATIENT
Start: 2018-11-16 | End: 2019-05-20 | Stop reason: SDUPTHER

## 2018-11-16 RX ORDER — ONDANSETRON 4 MG/1
8 TABLET, FILM COATED ORAL EVERY 6 HOURS PRN
Qty: 20 TABLET | Refills: 0 | Status: SHIPPED | OUTPATIENT
Start: 2018-11-16 | End: 2018-11-20 | Stop reason: SDUPTHER

## 2018-11-20 DIAGNOSIS — R11.0 NAUSEA: ICD-10-CM

## 2018-11-20 DIAGNOSIS — F41.9 ANXIETY: ICD-10-CM

## 2018-11-20 RX ORDER — VENLAFAXINE 75 MG/1
225 TABLET ORAL DAILY
Qty: 270 TABLET | Refills: 1 | Status: SHIPPED | OUTPATIENT
Start: 2018-11-20 | End: 2019-01-03

## 2018-11-20 RX ORDER — ONDANSETRON HYDROCHLORIDE 8 MG/1
8 TABLET, FILM COATED ORAL EVERY 6 HOURS PRN
Qty: 120 TABLET | Refills: 1 | Status: SHIPPED | OUTPATIENT
Start: 2018-11-20 | End: 2019-01-03

## 2018-11-20 RX ORDER — VENLAFAXINE 75 MG/1
225 TABLET ORAL DAILY
Qty: 90 TABLET | Refills: 0 | Status: SHIPPED | OUTPATIENT
Start: 2018-11-20 | End: 2018-11-20 | Stop reason: SDUPTHER

## 2018-11-20 RX ORDER — DULOXETIN HYDROCHLORIDE 60 MG/1
60 CAPSULE, DELAYED RELEASE ORAL DAILY
Qty: 90 CAPSULE | Refills: 1 | Status: SHIPPED | OUTPATIENT
Start: 2018-11-20 | End: 2019-05-20 | Stop reason: SDUPTHER

## 2018-11-26 ENCOUNTER — TELEPHONE (OUTPATIENT)
Dept: FAMILY MEDICINE CLINIC | Facility: CLINIC | Age: 43
End: 2018-11-26

## 2018-11-27 RX ORDER — ONDANSETRON HYDROCHLORIDE 4 MG/5ML
8 SOLUTION ORAL EVERY 6 HOURS PRN
Qty: 500 ML | Refills: 2 | Status: SHIPPED | OUTPATIENT
Start: 2018-11-27 | End: 2019-06-28

## 2018-11-27 RX ORDER — GABAPENTIN 250 MG/5ML
750 SOLUTION ORAL 4 TIMES DAILY
Qty: 1800 ML | Refills: 0 | Status: SHIPPED | OUTPATIENT
Start: 2018-11-27 | End: 2019-01-03

## 2018-11-27 RX ORDER — GABAPENTIN 250 MG/5ML
750 SOLUTION ORAL 4 TIMES DAILY
Qty: 1800 ML | Refills: 0 | Status: SHIPPED | OUTPATIENT
Start: 2018-11-27 | End: 2018-11-27 | Stop reason: SDUPTHER

## 2018-11-27 RX ORDER — HYDROCODONE BITARTRATE AND ACETAMINOPHEN 5; 217 MG/10ML; MG/10ML
10 SOLUTION ORAL EVERY 8 HOURS PRN
Qty: 900 ML | Refills: 0 | Status: SHIPPED | OUTPATIENT
Start: 2018-11-27 | End: 2019-01-03

## 2018-11-29 ENCOUNTER — TELEPHONE (OUTPATIENT)
Dept: FAMILY MEDICINE CLINIC | Facility: CLINIC | Age: 43
End: 2018-11-29

## 2018-11-29 NOTE — TELEPHONE ENCOUNTER
She has pain tablets and now liquid prescription and follow up with pain management in early December. She should be careful with how much she takes because pain medication can also cause worsening pain though it seems counterintuitive has found to be the case.

## 2018-12-17 ENCOUNTER — TELEPHONE (OUTPATIENT)
Dept: FAMILY MEDICINE CLINIC | Facility: CLINIC | Age: 43
End: 2018-12-17

## 2018-12-17 NOTE — TELEPHONE ENCOUNTER
Nurse from Dr. Thomas office  states that no one recommended the increase on this patients Trazdone.

## 2018-12-18 RX ORDER — TRAZODONE HYDROCHLORIDE 100 MG/1
100 TABLET ORAL 2 TIMES DAILY
Qty: 60 TABLET | Refills: 0 | Status: SHIPPED | OUTPATIENT
Start: 2018-12-18 | End: 2019-01-08 | Stop reason: SDUPTHER

## 2018-12-18 RX ORDER — TRAZODONE HYDROCHLORIDE 100 MG/1
TABLET ORAL
Qty: 180 TABLET | Refills: 0 | OUTPATIENT
Start: 2018-12-18

## 2018-12-20 DIAGNOSIS — G43.009 MIGRAINE WITHOUT AURA AND WITHOUT STATUS MIGRAINOSUS, NOT INTRACTABLE: ICD-10-CM

## 2018-12-21 RX ORDER — RIZATRIPTAN BENZOATE 10 MG/1
TABLET, ORALLY DISINTEGRATING ORAL
Qty: 12 TABLET | Refills: 0 | Status: SHIPPED | OUTPATIENT
Start: 2018-12-21 | End: 2019-01-14 | Stop reason: SDUPTHER

## 2018-12-26 DIAGNOSIS — M79.7 FIBROMYALGIA: ICD-10-CM

## 2018-12-27 RX ORDER — GABAPENTIN 800 MG/1
TABLET ORAL
Qty: 120 TABLET | Refills: 0 | Status: SHIPPED | OUTPATIENT
Start: 2018-12-27 | End: 2019-01-03 | Stop reason: SDUPTHER

## 2018-12-28 DIAGNOSIS — M79.7 FIBROMYALGIA: ICD-10-CM

## 2018-12-28 RX ORDER — GABAPENTIN 800 MG/1
TABLET ORAL
Qty: 120 TABLET | Refills: 0 | OUTPATIENT
Start: 2018-12-28

## 2019-01-03 ENCOUNTER — OFFICE VISIT (OUTPATIENT)
Dept: FAMILY MEDICINE CLINIC | Facility: CLINIC | Age: 44
End: 2019-01-03

## 2019-01-03 VITALS
RESPIRATION RATE: 19 BRPM | HEART RATE: 96 BPM | TEMPERATURE: 98.5 F | SYSTOLIC BLOOD PRESSURE: 92 MMHG | BODY MASS INDEX: 25.54 KG/M2 | OXYGEN SATURATION: 98 % | DIASTOLIC BLOOD PRESSURE: 58 MMHG | WEIGHT: 141.9 LBS

## 2019-01-03 DIAGNOSIS — R11.2 SEVERE NAUSEA AND VOMITING THAT HAS LASTED A LONG TIME: ICD-10-CM

## 2019-01-03 DIAGNOSIS — E55.9 VITAMIN D DEFICIENCY: ICD-10-CM

## 2019-01-03 DIAGNOSIS — K31.84 GASTROPARESIS: ICD-10-CM

## 2019-01-03 DIAGNOSIS — Z74.09 IMMOBILITY: ICD-10-CM

## 2019-01-03 DIAGNOSIS — M79.7 FIBROMYALGIA: ICD-10-CM

## 2019-01-03 DIAGNOSIS — E61.0 COPPER DEFICIENCY: Primary | ICD-10-CM

## 2019-01-03 PROCEDURE — 99214 OFFICE O/P EST MOD 30 MIN: CPT | Performed by: FAMILY MEDICINE

## 2019-01-03 RX ORDER — GABAPENTIN 800 MG/1
TABLET ORAL
Qty: 120 TABLET | Refills: 2 | Status: SHIPPED | OUTPATIENT
Start: 2019-01-03 | End: 2019-03-22 | Stop reason: SDUPTHER

## 2019-01-03 RX ORDER — VENLAFAXINE 75 MG/1
75 TABLET ORAL 3 TIMES DAILY
Qty: 270 TABLET | Refills: 1 | Status: SHIPPED | OUTPATIENT
Start: 2019-01-03 | End: 2019-04-09 | Stop reason: CLARIF

## 2019-01-03 RX ORDER — DEXTROSE AND SODIUM CHLORIDE 5; .45 G/100ML; G/100ML
INJECTION, SOLUTION INTRAVENOUS
COMMUNITY
Start: 2018-12-19

## 2019-01-03 RX ORDER — ONDANSETRON 2 MG/ML
INJECTION INTRAMUSCULAR; INTRAVENOUS
COMMUNITY
Start: 2018-12-19 | End: 2019-01-03

## 2019-01-03 RX ORDER — VENLAFAXINE 75 MG/1
75 TABLET ORAL 3 TIMES DAILY
COMMUNITY
End: 2019-01-03 | Stop reason: SDUPTHER

## 2019-01-03 RX ORDER — BUDESONIDE AND FORMOTEROL FUMARATE DIHYDRATE 160; 4.5 UG/1; UG/1
AEROSOL RESPIRATORY (INHALATION)
COMMUNITY
End: 2019-04-09

## 2019-01-03 RX ORDER — BUSPIRONE HYDROCHLORIDE 5 MG/1
5 TABLET ORAL 3 TIMES DAILY
Qty: 90 TABLET | Refills: 0 | Status: SHIPPED | OUTPATIENT
Start: 2019-01-03 | End: 2019-01-30

## 2019-01-03 RX ORDER — TRAZODONE HYDROCHLORIDE 100 MG/1
100 TABLET ORAL 2 TIMES DAILY
COMMUNITY
End: 2019-01-03

## 2019-01-03 NOTE — PROGRESS NOTES
Subjective    Chief Complaint   Patient presents with   • Med Refill     refill    • Med Management      Charline Cook is a 43 y.o. female.     History of Present Illness   Hot flashes and short of breath  Wants the copper checked again had labs with GI but did not get copper at that time. Symptoms worsening. Also with weakness and all these seemed to be worse when her copper was low.     GI: Tube Feeds/appetite loss/gastroparesis  She is seeing [facility] GI motility clinic Dr. Thomas. She is going to get a permanent GJ tube next month. They have been working on GI stimulator but says insurance is not wanting to help cover. Tube feeds have not been great. Trazodone helps a lot with appetite and feed tolerance. Doing the 100 twice daily but per pt GI says she can go higher on this dosing. She otherwise is having a lot of nausea, some vomiting and dry heaves. Finds zofran works the best for this of all the things she has tried. She does a liter of fluids through her port daily over 5 hours. Working with her port has gone well though noted a little bleeding under the bandage yesterday. She called surgeons office and port is working fine, someone coming to house with home health to check it tomorrow.     Pain  Seeing pain management now. She has chronic pain a lot related to the gastroparesis. Also with knee pain, needs surgery but on hold at this time. Found that she felt good on the oxycodone but made her irritable. Was on for a couple of weeks but felt she was not being good to her family and wanted to go back to the hydrocodone even though less effective. Also having more pain in general and as addressed above thought could be related to her copper level declining again.    Immobility  This is related to her BLE weakness and she is wheelchair bound. Also in bed 5 hours everyday for her fluids. She does have assistance to walk from the house to the car and car to house otherwise in the wheelchair. Holds on to furniture  in her room from the bed to the bathroom several times daily. She is not anticoagulated.   Related to the    The following portions of the patient's history were reviewed and updated as appropriate: allergies, current medications, past medical history, past social history and problem list.    Review of Systems   HENT: Positive for congestion, ear discharge, ear pain and sinus pressure.    Eyes: Negative.    Respiratory: Positive for shortness of breath.    Cardiovascular: Negative.    Gastrointestinal: Positive for abdominal pain, diarrhea, nausea and vomiting.   Endocrine: Negative.    Genitourinary: Negative.    Musculoskeletal: Negative.    Skin: Negative.    Allergic/Immunologic: Negative.    Neurological: Positive for weakness and numbness.   Psychiatric/Behavioral: Negative.        Objective    Vitals:    01/03/19 1511   BP: 92/58   Pulse: 96   Resp: 19   Temp: 98.5 °F (36.9 °C)   SpO2: 98%      Physical Exam   Constitutional: She is oriented to person, place, and time. She appears well-nourished. No distress.   Eyes: Conjunctivae are normal. Right eye exhibits no discharge. Left eye exhibits no discharge. No scleral icterus.   Cardiovascular: Normal rate, regular rhythm, normal heart sounds and intact distal pulses. Exam reveals no gallop and no friction rub.   No murmur heard.  Pulmonary/Chest: Effort normal and breath sounds normal. No respiratory distress. She has no wheezes.   Musculoskeletal: She exhibits no edema.   Neurological: She is alert and oriented to person, place, and time.   Skin:   Port site is dry, there is not swelling, line does not appear to be loose or have extra mobility. There is dried blood under the bandage but no apparent active bleeding. No other discoloration at the site.   Psychiatric: She has a normal mood and affect. Her behavior is normal.   Vitals reviewed.      Assessment/Plan   Charline was seen today for med refill and med management.    Diagnoses and all orders for this  visit:    Copper deficiency  -     Copper, Serum  Will check in on level today. Having some recurrent symptoms similar to problems when copper is low. Will forward on to Dr. Thomas.   Gastroparesis  Managed by GI motility clinic at [facility]. She has had NJ tube for months now and going to be going to surgery next couple of months for GJ tube. Tolerating some feeds at home, trazodone helps. Pt reports GI endorses much higher dosing of the trazodone and they will forward those recommendations on to this office. Have discussed with pt given her medication list there are risks, namely with trazodone risk for arrhythmia, QT prolongation.   Immobility  Discussed encouraged PT. Understanding her pain and nausea is a big barrier but mobility will help prevent blood clot and with atrophy. Pt voiced understanding.  Fibromyalgia  -     gabapentin (NEURONTIN) 800 MG tablet; Take 1 tablet four times per day    Vitamin D deficiency  -     Vitamin D 25 Hydroxy    Severe nausea and vomiting that has lasted a long time    Other orders  -     TRANSDERM-SCOP, 1.5 MG, 1.5 MG/3DAYS patch; Place 1 patch on the skin as directed by provider Every 72 (Seventy-Two) Hours.  -     venlafaxine (EFFEXOR) 75 MG tablet; 1 tablet by Per J Tube route 3 (Three) Times a Day.  -     busPIRone (BUSPAR) 5 MG tablet; Take 1 tablet by mouth 3 (Three) Times a Day.

## 2019-01-05 LAB
25(OH)D3+25(OH)D2 SERPL-MCNC: 20.2 NG/ML (ref 30–100)
COPPER SERPL-MCNC: 69 UG/DL (ref 72–166)

## 2019-01-07 ENCOUNTER — TELEPHONE (OUTPATIENT)
Dept: FAMILY MEDICINE CLINIC | Facility: CLINIC | Age: 44
End: 2019-01-07

## 2019-01-07 NOTE — TELEPHONE ENCOUNTER
Received notification from [employer] on this patient PA. IT was approved for scopolamine 1 mg patches until 12/31/19. Contacted patients pharmacy and advised them and then tried to run again and still was getting a denial. Pharmacy contacted insurance co. Stated if they need anything else they would call office.

## 2019-01-08 RX ORDER — TRAZODONE HYDROCHLORIDE 100 MG/1
TABLET ORAL
Qty: 60 TABLET | Refills: 0 | Status: SHIPPED | OUTPATIENT
Start: 2019-01-08 | End: 2019-01-30 | Stop reason: SDUPTHER

## 2019-01-14 DIAGNOSIS — G43.009 MIGRAINE WITHOUT AURA AND WITHOUT STATUS MIGRAINOSUS, NOT INTRACTABLE: ICD-10-CM

## 2019-01-14 RX ORDER — RIZATRIPTAN BENZOATE 10 MG/1
TABLET, ORALLY DISINTEGRATING ORAL
Qty: 12 TABLET | Refills: 0 | Status: SHIPPED | OUTPATIENT
Start: 2019-01-14 | End: 2019-02-28 | Stop reason: SDUPTHER

## 2019-01-17 ENCOUNTER — TELEPHONE (OUTPATIENT)
Dept: FAMILY MEDICINE CLINIC | Facility: CLINIC | Age: 44
End: 2019-01-17

## 2019-01-17 NOTE — TELEPHONE ENCOUNTER
It can interact with the trazodone and increase risk of arrhythmia potentially. They both can cause a change in conduction which can be dangerous. So taking more than one medication that does that increases the risk.

## 2019-01-17 NOTE — TELEPHONE ENCOUNTER
called office today stated that he was sick with URI and now Charline is having symptoms. He wanted to know if there was anything in her medical hx that would hurt or harm her if she took a zpac. Please advise.

## 2019-01-18 NOTE — TELEPHONE ENCOUNTER
Left message on machine for patient and told her what Allison stated and advised her that if she is sick that she needs to be seen for Allison to eval her and her symptoms to prescribe safe medication for her.

## 2019-01-25 ENCOUNTER — TELEPHONE (OUTPATIENT)
Dept: FAMILY MEDICINE CLINIC | Facility: CLINIC | Age: 44
End: 2019-01-25

## 2019-01-25 RX ORDER — AMOXICILLIN 875 MG/1
875 TABLET, COATED ORAL 2 TIMES DAILY
Qty: 14 TABLET | Refills: 0 | Status: SHIPPED | OUTPATIENT
Start: 2019-01-25 | End: 2019-04-09

## 2019-01-25 NOTE — TELEPHONE ENCOUNTER
Spoke with Regine who is the physician assistant working with Dr. coker at [facility].  Patient follows with the GI motility team there for her gastroparesis.  The call was concerning recommendations for trazodone use with appetite and tube feed tolerance.  Patient reported that the GI team suggested taking 600 mg of trazodone daily for patient's appetite, nausea and tolerance for feeds.  This was not in any of the documentation from that office so we wanted to call and confirm.  Regine reported that they generally don't use trazodone and that way and Dr. coker does not per routine prescribed trazodone.  Regine was able to talk to Dr. coker between procedures and Dr. whittington also recalled patient requesting this dose of trazodone but had never suggested it or recommended it.  They are okay with the 200 mg per day of trazodone that the patient is on.  Currently she is taking 100 mg twice a day.  My concern with the trazodone regarding other medications she is on that we can have prolonged QT and have her more susceptible for arrhythmias.  We will keep the dose at 200 mg daily and communicate this with the patient.

## 2019-01-25 NOTE — TELEPHONE ENCOUNTER
called office today stating that she has a white count of 14,000 and want to know if she should be seen. This nurse contact patient and ask if she is symptomatic.Pt states that she has had cough non productive for over week along with congestion, sinus pressure and pain, sore throat. Please advise if you want to see this patient today.

## 2019-01-25 NOTE — TELEPHONE ENCOUNTER
I will send her amoxicillin for sinusitis however if her symptoms worsen or she develops more symptoms would recommend she go to UC or ED.

## 2019-01-28 ENCOUNTER — TELEPHONE (OUTPATIENT)
Dept: FAMILY MEDICINE CLINIC | Facility: CLINIC | Age: 44
End: 2019-01-28

## 2019-01-30 ENCOUNTER — HOSPITAL ENCOUNTER (OUTPATIENT)
Dept: GENERAL RADIOLOGY | Facility: HOSPITAL | Age: 44
Discharge: HOME | End: 2019-01-30
Admitting: FAMILY MEDICINE

## 2019-01-30 ENCOUNTER — OFFICE VISIT (OUTPATIENT)
Dept: FAMILY MEDICINE CLINIC | Facility: CLINIC | Age: 44
End: 2019-01-30

## 2019-01-30 VITALS
DIASTOLIC BLOOD PRESSURE: 60 MMHG | WEIGHT: 134 LBS | TEMPERATURE: 98 F | HEIGHT: 63 IN | SYSTOLIC BLOOD PRESSURE: 118 MMHG | HEART RATE: 98 BPM | RESPIRATION RATE: 19 BRPM | BODY MASS INDEX: 23.74 KG/M2 | OXYGEN SATURATION: 96 %

## 2019-01-30 DIAGNOSIS — J02.9 SORE THROAT: ICD-10-CM

## 2019-01-30 DIAGNOSIS — Z79.899 HIGH RISK MEDICATION USE: Primary | ICD-10-CM

## 2019-01-30 DIAGNOSIS — Z46.59 ENCOUNTER FOR NASOJEJUNAL (NJ) TUBE PLACEMENT: ICD-10-CM

## 2019-01-30 DIAGNOSIS — E61.0 COPPER DEFICIENCY: ICD-10-CM

## 2019-01-30 PROCEDURE — 93000 ELECTROCARDIOGRAM COMPLETE: CPT | Performed by: FAMILY MEDICINE

## 2019-01-30 PROCEDURE — 74018 RADEX ABDOMEN 1 VIEW: CPT

## 2019-01-30 PROCEDURE — 99214 OFFICE O/P EST MOD 30 MIN: CPT | Performed by: FAMILY MEDICINE

## 2019-01-30 RX ORDER — OXYCODONE AND ACETAMINOPHEN 7.5; 325 MG/1; MG/1
1 TABLET ORAL EVERY 6 HOURS PRN
COMMUNITY
End: 2019-04-09 | Stop reason: SDUPTHER

## 2019-01-30 NOTE — PROGRESS NOTES
Chief Complaint   Patient presents with   • Sore Throat     nasogastic tube has bleeding    • Earache     bilateral    • Labs Only     increased white count       Subjective   Charline Cook is a 43 y.o. female.     History of Present Illness   Sore throat  Started about one week ago. Severe, hurts down on th right side of her neck. Hurts to swallow not drinking anything by mouth for days. Taking ibuprofen for pain. Has been having foaming at the mouth with using her tube for about 1.5 weeks. She was having associated ear pain and cough and rhinorrhea and nasal congestion we treated her with amoxicillin which is she is still currently finishing up for sinusitis but think she is really gotten worse instead of better while on the antibiotic.  Her  and daughter with him she lives were treated with 2 rounds of azithromycin each and he both improved.  They too had severe sore throat.  We did not call with azithromycin related to concern for prolong her QT interval as she is on multiple medications that can do that.she takes 200 mg of trazodone daily and also multiple.  She has had her NJ tube. since November 8, 2018 and it can be irritating.    Copper deficiency  The patient was admitted to the hospital and of 2018 was found have Copper deficiency.  She was replaced at that time intravenously.  Infusions of Copper are given over 5 days.  Thinks that this was ordered by neurology in the hospital and she did not require neurology follow-up at discharge.  She is taking oral copper gluconate at home.  Reported that she has been educated the thought is that oral copper is not as well absorbed as the IV and for really boosting her levels she will need to get infused with Copper again.  Her home health team does have the capabilities of giving IV iron and they can come out every day for 5 days to administer.  Her most recent copper we checked at last visit a few weeks back with below normal at 69.  Patient reported she  "knows when she's getting low because she develops hot flashes and pain and weakness in her bilateral lower extremities.  She is essentially wheelchair bound and is working toward getting him an electric wheelchair so she is less dependent on her family. After discharge from the hospital and of last year she was doing rehabilitation and was getting stronger in her bilateral lower extremities but has been not feeling well and unable to do was required for rehabilitation.    High-risk medication management   Patient takes 200 mg of trazodone and she also doses a milligrams of ondansetron multiple times per day for nausea.    The following portions of the patient's history were reviewed and updated as appropriate: allergies, current medications, past medical history, past social history and problem list.    Review of Systems   HENT: Positive for congestion, ear pain, sinus pressure, sinus pain and sore throat.    Respiratory: Negative.    Cardiovascular: Positive for chest pain and palpitations.   Gastrointestinal: Positive for abdominal pain, nausea and vomiting.       Vitals:    01/30/19 1228   BP: 118/60   BP Location: Left arm   Patient Position: Sitting   Cuff Size: Adult   Pulse: 98   Resp: 19   Temp: 98 °F (36.7 °C)   TempSrc: Oral   SpO2: 96%   Weight: 60.8 kg (134 lb)   Height: 158.8 cm (62.5\")       Objective   Physical Exam   Constitutional: She appears well-nourished. No distress.   HENT:   Right Ear: External ear normal.   Left Ear: External ear normal.   Mouth/Throat: Oropharynx is clear and moist. No oropharyngeal exudate.   TMs and canals normal, clear. There is minimal clear thick mucous in the posterior oropharynx but mucosa is non-erythematous.   Eyes: Right eye exhibits no discharge. Left eye exhibits no discharge. No scleral icterus.   Pulmonary/Chest: Effort normal. No respiratory distress.   Vitals reviewed.      Assessment/Plan   Charline was seen today for sore throat, earache and labs " only.    Diagnoses and all orders for this visit:    High risk medication use  -     ECG 12 Lead  Pt had been requesting higher doses of trazodone. We will consider increasing so she can take 100 mg of trazodone TID so having with feeds because is helping with nausea and appetite but will keep at BID at this time. ECG in the office today is normal.   Copper deficiency  Just below normal at last check 3 weeks ago. Need to look into copper infusion to keep levels in normal range.   Encounter for nasojejunal (NJ) tube placement  -     FL gi tube placement  -     XR Abdomen KUB  Pt with concern for movement of tube about 2 days ago. Still using tube. Having more trouble with feeds, foaming in mouth for 1.5 weeks. We will check for appropriate placement now. She has follow up with her GI tomorrow. Initially was to have permanent J tube placed but surgeon unable to do and has not been assigned to another surgeon.  reported in office yesterday and they have follow up tomorrow to pin down when this will be now.  Sore throat  To clean multifactorial.  She has been on amoxicillin scheduled to get strep or bacterial otherwise.  Most likely viral if infectious, she is dehydrated avoiding anything by mouth she gets 1 L of fluids IV per day and tolerates her feet intermittently.  Also she has had the NJ tube for a couple of months at this point and this is also likely irritating in the setting.  Counseled her on hydration      KUB performed in radiology  Demonstrated the feeding tube to be in place. Distal tip is at the level of the RUQ and expected location of proximal jejunum or 2nd portion of the duodenum. Bowel gas pattern is non-obstructive.

## 2019-01-30 NOTE — PROGRESS NOTES
Procedure     ECG 12 Lead  Date/Time: 1/30/2019 4:29 PM  Performed by: Yvette Cooper MD  Authorized by: Yvette Cooper MD   Comparison: not compared with previous ECG   Previous ECG: no previous ECG available  Rhythm: sinus rhythm  Rate: normal  Conduction: conduction normal  ST Segments: ST segments normal  T Waves: T waves normal  QRS axis: normal  Other: no other findings  Clinical impression: normal ECG  Comments:  QTc 408

## 2019-01-31 RX ORDER — TRAZODONE HYDROCHLORIDE 100 MG/1
TABLET ORAL
Qty: 60 TABLET | Refills: 0 | Status: SHIPPED | OUTPATIENT
Start: 2019-01-31 | End: 2019-02-22 | Stop reason: SDUPTHER

## 2019-02-19 DIAGNOSIS — D51.3 OTHER DIETARY VITAMIN B12 DEFICIENCY ANEMIA: ICD-10-CM

## 2019-02-19 DIAGNOSIS — R79.89 ELEVATED LFTS: ICD-10-CM

## 2019-02-19 DIAGNOSIS — R11.2 SEVERE NAUSEA AND VOMITING THAT HAS LASTED A LONG TIME: ICD-10-CM

## 2019-02-19 DIAGNOSIS — E55.9 VITAMIN D DEFICIENCY: Primary | ICD-10-CM

## 2019-02-20 ENCOUNTER — LAB (OUTPATIENT)
Dept: FAMILY MEDICINE CLINIC | Facility: CLINIC | Age: 44
End: 2019-02-20

## 2019-02-20 DIAGNOSIS — D51.3 OTHER DIETARY VITAMIN B12 DEFICIENCY ANEMIA: ICD-10-CM

## 2019-02-20 DIAGNOSIS — R11.2 SEVERE NAUSEA AND VOMITING THAT HAS LASTED A LONG TIME: ICD-10-CM

## 2019-02-20 DIAGNOSIS — E55.9 VITAMIN D DEFICIENCY: ICD-10-CM

## 2019-02-20 DIAGNOSIS — R79.89 ELEVATED LFTS: ICD-10-CM

## 2019-02-22 LAB
25(OH)D3+25(OH)D2 SERPL-MCNC: 16.2 NG/ML (ref 30–100)
BUN SERPL-MCNC: 8 MG/DL (ref 6–24)
BUN/CREAT SERPL: 9 (ref 9–23)
CALCIUM SERPL-MCNC: 9 MG/DL (ref 8.7–10.2)
CHLORIDE SERPL-SCNC: 102 MMOL/L (ref 96–106)
CO2 SERPL-SCNC: 25 MMOL/L (ref 20–29)
COPPER SERPL-MCNC: 49 UG/DL (ref 72–166)
CREAT SERPL-MCNC: 0.9 MG/DL (ref 0.57–1)
GLUCOSE SERPL-MCNC: 69 MG/DL (ref 65–99)
IRON SERPL-MCNC: 55 UG/DL (ref 27–159)
POTASSIUM SERPL-SCNC: 3.9 MMOL/L (ref 3.5–5.2)
SODIUM SERPL-SCNC: 142 MMOL/L (ref 134–144)
VIT B12 SERPL-MCNC: 711 PG/ML (ref 232–1245)

## 2019-02-22 RX ORDER — TRAZODONE HYDROCHLORIDE 100 MG/1
100 TABLET ORAL 2 TIMES DAILY
Qty: 60 TABLET | Refills: 2 | Status: SHIPPED | OUTPATIENT
Start: 2019-02-22 | End: 2019-02-26 | Stop reason: SDUPTHER

## 2019-02-22 RX ORDER — TRAZODONE HYDROCHLORIDE 100 MG/1
TABLET ORAL
Qty: 60 TABLET | Refills: 0 | Status: CANCELLED | OUTPATIENT
Start: 2019-02-22

## 2019-02-25 ENCOUNTER — TELEPHONE (OUTPATIENT)
Dept: FAMILY MEDICINE CLINIC | Facility: CLINIC | Age: 44
End: 2019-02-25

## 2019-02-25 NOTE — TELEPHONE ENCOUNTER
[facility] called [facility] today r/t this paitent and her current state with their office. She had a EMG done in the hospital by one of their fellow . She is being set up as a new patient with Dr. Abad.

## 2019-02-26 RX ORDER — TRAZODONE HYDROCHLORIDE 100 MG/1
100 TABLET ORAL 2 TIMES DAILY
Qty: 60 TABLET | Refills: 2 | Status: SHIPPED | OUTPATIENT
Start: 2019-02-26 | End: 2019-06-04 | Stop reason: SDUPTHER

## 2019-02-28 DIAGNOSIS — G43.009 MIGRAINE WITHOUT AURA AND WITHOUT STATUS MIGRAINOSUS, NOT INTRACTABLE: ICD-10-CM

## 2019-03-01 RX ORDER — RIZATRIPTAN BENZOATE 10 MG/1
TABLET, ORALLY DISINTEGRATING ORAL
Qty: 12 TABLET | Refills: 0 | Status: SHIPPED | OUTPATIENT
Start: 2019-03-01 | End: 2019-03-27 | Stop reason: SDUPTHER

## 2019-03-22 DIAGNOSIS — M79.7 FIBROMYALGIA: ICD-10-CM

## 2019-03-25 RX ORDER — GABAPENTIN 800 MG/1
TABLET ORAL
Qty: 120 TABLET | Refills: 1 | Status: SHIPPED | OUTPATIENT
Start: 2019-03-25 | End: 2019-05-20 | Stop reason: SDUPTHER

## 2019-03-27 DIAGNOSIS — G43.009 MIGRAINE WITHOUT AURA AND WITHOUT STATUS MIGRAINOSUS, NOT INTRACTABLE: ICD-10-CM

## 2019-03-27 RX ORDER — RIZATRIPTAN BENZOATE 10 MG/1
TABLET, ORALLY DISINTEGRATING ORAL
Qty: 12 TABLET | Refills: 0 | Status: SHIPPED | OUTPATIENT
Start: 2019-03-27 | End: 2019-06-30 | Stop reason: SDUPTHER

## 2019-04-04 ENCOUNTER — TELEPHONE (OUTPATIENT)
Dept: FAMILY MEDICINE CLINIC | Facility: CLINIC | Age: 44
End: 2019-04-04

## 2019-04-04 NOTE — TELEPHONE ENCOUNTER
Pt  called office r/t patuient having chest tube placement. Pt was suppose to go to a skilled nursing home to recover but at last minute decided to go home.  states that patient is in a lot of pain at this time. Call surgeons office and they refused to give any  medication for pain control. They are requesting percocet and flexeril order to help with patient. Advised them that you are out of office at this time. Pt has appt on Monday to see you.

## 2019-04-09 ENCOUNTER — TELEPHONE (OUTPATIENT)
Dept: FAMILY MEDICINE CLINIC | Facility: CLINIC | Age: 44
End: 2019-04-09

## 2019-04-09 ENCOUNTER — OFFICE VISIT (OUTPATIENT)
Dept: FAMILY MEDICINE CLINIC | Facility: CLINIC | Age: 44
End: 2019-04-09

## 2019-04-09 VITALS
OXYGEN SATURATION: 97 % | HEART RATE: 107 BPM | TEMPERATURE: 99.5 F | RESPIRATION RATE: 22 BRPM | DIASTOLIC BLOOD PRESSURE: 76 MMHG | SYSTOLIC BLOOD PRESSURE: 122 MMHG

## 2019-04-09 DIAGNOSIS — G63 COPPER DEFICIENCY MYELONEUROPATHY (HCC): ICD-10-CM

## 2019-04-09 DIAGNOSIS — E46 MALNUTRITION, UNSPECIFIED TYPE (HCC): ICD-10-CM

## 2019-04-09 DIAGNOSIS — K31.84 GASTROPARESIS: Primary | ICD-10-CM

## 2019-04-09 DIAGNOSIS — G99.2 COPPER DEFICIENCY MYELONEUROPATHY (HCC): ICD-10-CM

## 2019-04-09 DIAGNOSIS — E61.0 COPPER DEFICIENCY MYELONEUROPATHY (HCC): ICD-10-CM

## 2019-04-09 DIAGNOSIS — F33.1 MODERATE EPISODE OF RECURRENT MAJOR DEPRESSIVE DISORDER (HCC): ICD-10-CM

## 2019-04-09 PROCEDURE — 99214 OFFICE O/P EST MOD 30 MIN: CPT | Performed by: FAMILY MEDICINE

## 2019-04-09 RX ORDER — CYCLOBENZAPRINE HCL 10 MG
10 TABLET ORAL NIGHTLY PRN
Qty: 90 TABLET | Refills: 1 | Status: SHIPPED | OUTPATIENT
Start: 2019-04-09 | End: 2019-06-15 | Stop reason: DRUGHIGH

## 2019-04-09 RX ORDER — VENLAFAXINE HYDROCHLORIDE 225 MG/1
225 TABLET, EXTENDED RELEASE ORAL DAILY
Qty: 90 EACH | Refills: 0 | Status: SHIPPED | OUTPATIENT
Start: 2019-04-09 | End: 2020-01-10 | Stop reason: SDUPTHER

## 2019-04-09 RX ORDER — OXYCODONE AND ACETAMINOPHEN 7.5; 325 MG/1; MG/1
2 TABLET ORAL EVERY 6 HOURS PRN
Qty: 240 TABLET | Refills: 0 | Status: SHIPPED | OUTPATIENT
Start: 2019-04-09 | End: 2019-05-09 | Stop reason: SDUPTHER

## 2019-04-09 NOTE — TELEPHONE ENCOUNTER
Pt  called office multiple times wanting to get a hold of staff r/t patient pain meds.  was requesting an override to her chart r/t patient having another rx recently refilled. Spoke with pharmacist and came up with a plan r/t pt receiving 120 tablets of Percocet 10/325mg on 3/28/19. This plan was then changed r/t speaking with the  and being told that april has used all 120 tablets and is currently out of medications.  Dr Cooper wrote rx today for pt to have percocet 7.5/325 mg 2 tablets q 6 hours. Due to pt receiving rx for percocet 10/325 mg on 3/28/19 the pharmacy was needing an override from Dr. Cooper stating that she understood that she just received an rx  For 120 tablets on 3/28/19 and was giving her another rx for higher dose. Verbal was given to pharmacy. Allison was informed and was ok with current order given. Allison taking over this patients pain medications needs.  was notified per Allison orders that this will be the only early fill ever and stated that he understood. Pharmacist was advised to do override and informed this nurse that the override went through insurance.

## 2019-04-09 NOTE — PROGRESS NOTES
"Chief Complaint   Patient presents with   • Depression     PHQ-2 score 18   • Follow-up     3 mth        Subjective   Charline Cook is a 43 y.o. female.     History of Present Illness   Depression  Patient presents today with significant worsening of her depression.  She has always been done well with her duloxetine and venlafaxine combination.  She is not sure if they are not working or she is just had a rough road with recent hospitalizations that she is really getting tired of \"fighting.\"  She was recently hospitalized [place] that she was hospital with her care team at [facility].  She had open jejunostomy tube placement on 3/12/2019.  She has an incision from her epigastrium to her umbilicus.  Reported there on the history and physical she was getting best results for her nausea using the trazodone and consistent dosing of Zofran through her tube.  Reported that Dilaudid works best for pain relief in the hospital and outside of the hospital she uses Percocet with good pain control.  Hospital course for G-tube placement reported some pain control issues and nausea when the J-tube feeds were titrated up.  She also had some IV replacement of copper during admission.  Patient reports following that surgery she had a lot of issues with pain control.  She also had nausea and diarrhea with multiple episodes of vomiting.  The Zofran that had been working previously was no longer helping as well and she had persistent vomiting.  She reported that both the surgical team and her pain management office noted that she was going to have a painful procedure, but patient reports no increase her pain medicines for discharge.  She was very frustrated and depressed with all that was going on and though the hospital team advised she go to rehab patient declined and wanted to go home.  Patient understood later she explains that the kind of rehab they wanted her to go to was like a stepdown unit where she would be getting a lot of " hands-on care and not just rehab facility.  Feels that she had understood this she might of been interested in going.  Since she was home has had significantly more pain.  After the surgery she started having pain in her bilateral upper extremities and her upper thighs describing it as a burning pain that is always there.  Found that if she took basically twice as much pain medication as had been prescribed that helped.  She had been taking instead of 7.5 mg of the Percocet 4 times a day 15 mg of the Percocet 4 times a day.  She reported this to her pain medication office and when she went to follow-up with them she saw a different provider who changed her 7.5 to 10 and so recommended 10 mg 4 times a day though this was not actually an increase in what she had been taking.  When she contacted the pain management provider who discussed the painful surgery she was going to undergo and increasing her dose of pain medication patient reported that provider relayed since the medication had already been changed and filled that she was unable to do anything about that.  Patient took her last 10 mg Percocet today prior to her appointment.  There is a lot of question about her level of absorption given her abnormal gastrointestinal tract and now utilizing the J-tube for almost all medications.  She continues to take the Cymbalta p.o. as this 1 does not do well through the tube.  They are wondering about taking the venlafaxine p.o. however she has great difficulty swallowing the tablets and previously had been on capsule of the venlafaxine that she tolerated much better p.o.  There is a question whether or not she is getting the same level of active medication in her system if she is using her J-tube.  She can use to have some nausea and not tolerating much p.o.  Most important thing to help at this time would be pain control and improving her mood again with her medications that she had previously done so well on.  Concerned  that her current pain management provider that she has never seen the same provider twice.  Once establishes a relationship then she sees him on new so it is hard to have that relationship required with a complicated case.    The following portions of the patient's history were reviewed and updated as appropriate: allergies, current medications, past medical history, past social history, past surgical history and problem list.    Review of Systems   Respiratory: Negative.    Cardiovascular: Positive for chest pain and palpitations.   Neurological: Positive for dizziness, weakness, light-headedness and headaches.       Vitals:    04/09/19 1356   BP: 122/76   BP Location: Right arm   Patient Position: Sitting   Cuff Size: Adult   Pulse: 107   Resp: 22   Temp: 99.5 °F (37.5 °C)   TempSrc: Oral   SpO2: 97%       Objective   Physical Exam   Constitutional: She is oriented to person, place, and time. She appears well-nourished. No distress.   Eyes: Conjunctivae are normal. No scleral icterus.   Pulmonary/Chest: Effort normal. No respiratory distress.   Neurological: She is alert and oriented to person, place, and time.   Psychiatric: Her behavior is normal.   Depressed but not tearful.  Apathetic.   Vitals reviewed.      Assessment/Plan   Charline was seen today for depression and follow-up.    Diagnoses and all orders for this visit:    Gastroparesis  -     Ambulatory Referral to Palliative Care  -     Ambulatory Referral to Home Health    Copper deficiency myeloneuropathy (CMS/HCC)  -     Ambulatory Referral to Palliative Care  -     Ambulatory Referral to Home Health    Malnutrition, unspecified type (CMS/HCC)  -     Ambulatory Referral to Palliative Care  -     Ambulatory Referral to Home Health    Moderate episode of recurrent major depressive disorder (CMS/HCC)  -     Ambulatory Referral to Home Health    Other orders  -     venlafaxine 225 MG tablet sustained-release 24 hour 24 hr tablet; Take 1 tablet by mouth  Daily.  -     cyclobenzaprine (FLEXERIL) 10 MG tablet; Take 1 tablet by mouth At Night As Needed for Muscle Spasms.  -     oxyCODONE-acetaminophen (PERCOCET) 7.5-325 MG per tablet; Take 2 tablets by mouth Every 6 (Six) Hours As Needed for Moderate Pain .      Pt would like for her pain medication to be taken over by me. She is having trouble with not seeing the same provider at her new pain management office and therefore not having the medication changes discussed from one visit to the next. Related to her recent surgery she had increased pain without pain medication from the surgical team. She did not take her medication as prescribed related to the pain she was experiencing. We have come up with a regimen of 15 mg of percocet 7.5-325 mg every 6 hours. She says this will help control her pain though not eliminate it. This is a significantly early refill and there will be no more permitted early refills. Additionally she has taken and tolerated this in recent months, or more so we will do this dosing today but will likely need to address in the future so that we can stream line her regimen and make it as safe as possible. She has significant allergy to morphine and fentanyl, dillaudid would be more potent but not long acting. Other complication to consider is her gastroparesis. Could consider some of the worsening of her nausea and vomiting that had previously been very well controlled with zofran and trazodone is related to her increased narcotic dosing at home and in the hospital. Narcotics would presumable worsen her gastroparesis. She is already on several medications with potential interactions that she has tolerated well to this point but may need to consider alternatives to treat her pain as well.    She really is interested is speaking with palliative care. She feels like she has no more fight in her. She does not have necessarily terminal conditions at this point. Her malnutrition can be addressed and has  been with supplements administered the most appropriate way given her malabsorption. She does need to move, she can benefit from PT and OT. Social work home health can address her depression and discuss her concerns regarding need for palliative care.     Close follow up.

## 2019-04-11 PROBLEM — R13.10 DYSPHAGIA: Status: ACTIVE | Noted: 2019-04-11

## 2019-04-11 PROBLEM — F32.9 MAJOR DEPRESSIVE DISORDER: Status: ACTIVE | Noted: 2019-04-11

## 2019-04-17 ENCOUNTER — TELEPHONE (OUTPATIENT)
Dept: FAMILY MEDICINE CLINIC | Facility: CLINIC | Age: 44
End: 2019-04-17

## 2019-04-17 NOTE — TELEPHONE ENCOUNTER
Received two messages one from nanda yesterday and one today from Olga about clarification this patient orders. Carlos A Cooper pt is not palliative care only to home health for PT OT. Gave order via phone call to Nanda at [facility].

## 2019-05-06 RX ORDER — OXYCODONE AND ACETAMINOPHEN 7.5; 325 MG/1; MG/1
2 TABLET ORAL EVERY 6 HOURS PRN
Qty: 240 TABLET | Refills: 0 | Status: CANCELLED | OUTPATIENT
Start: 2019-05-06

## 2019-05-09 ENCOUNTER — TELEPHONE (OUTPATIENT)
Dept: FAMILY MEDICINE CLINIC | Facility: CLINIC | Age: 44
End: 2019-05-09

## 2019-05-09 RX ORDER — OXYCODONE AND ACETAMINOPHEN 7.5; 325 MG/1; MG/1
2 TABLET ORAL EVERY 6 HOURS PRN
Qty: 240 TABLET | Refills: 0 | Status: SHIPPED | OUTPATIENT
Start: 2019-05-09 | End: 2019-06-05 | Stop reason: SDUPTHER

## 2019-05-20 DIAGNOSIS — F41.9 ANXIETY: ICD-10-CM

## 2019-05-20 DIAGNOSIS — M79.7 FIBROMYALGIA: ICD-10-CM

## 2019-05-20 RX ORDER — GABAPENTIN 800 MG/1
TABLET ORAL
Qty: 120 TABLET | Refills: 0 | Status: SHIPPED | OUTPATIENT
Start: 2019-05-20 | End: 2019-06-13 | Stop reason: SDUPTHER

## 2019-05-20 RX ORDER — NORTRIPTYLINE HYDROCHLORIDE 25 MG/1
25 CAPSULE ORAL NIGHTLY
Qty: 90 CAPSULE | Refills: 0 | Status: SHIPPED | OUTPATIENT
Start: 2019-05-20 | End: 2019-09-06 | Stop reason: SDUPTHER

## 2019-05-20 RX ORDER — DULOXETIN HYDROCHLORIDE 60 MG/1
60 CAPSULE, DELAYED RELEASE ORAL DAILY
Qty: 90 CAPSULE | Refills: 0 | Status: SHIPPED | OUTPATIENT
Start: 2019-05-20 | End: 2019-09-06 | Stop reason: SDUPTHER

## 2019-06-05 RX ORDER — TRAZODONE HYDROCHLORIDE 100 MG/1
TABLET ORAL
Qty: 60 TABLET | Refills: 0 | Status: SHIPPED | OUTPATIENT
Start: 2019-06-05 | End: 2019-06-30 | Stop reason: SDUPTHER

## 2019-06-05 RX ORDER — OXYCODONE AND ACETAMINOPHEN 7.5; 325 MG/1; MG/1
2 TABLET ORAL EVERY 6 HOURS PRN
Qty: 240 TABLET | Refills: 0 | Status: SHIPPED | OUTPATIENT
Start: 2019-06-05 | End: 2019-07-01 | Stop reason: SDUPTHER

## 2019-06-13 DIAGNOSIS — M79.7 FIBROMYALGIA: ICD-10-CM

## 2019-06-15 RX ORDER — CYCLOBENZAPRINE HCL 10 MG
10 TABLET ORAL 2 TIMES DAILY PRN
Qty: 180 TABLET | Refills: 0 | Status: SHIPPED | OUTPATIENT
Start: 2019-06-15 | End: 2019-10-25 | Stop reason: SDUPTHER

## 2019-06-15 RX ORDER — GABAPENTIN 800 MG/1
TABLET ORAL
Qty: 120 TABLET | Refills: 0 | Status: SHIPPED | OUTPATIENT
Start: 2019-06-15 | End: 2019-07-11 | Stop reason: SDUPTHER

## 2019-06-28 ENCOUNTER — OFFICE VISIT (OUTPATIENT)
Dept: FAMILY MEDICINE CLINIC | Facility: CLINIC | Age: 44
End: 2019-06-28

## 2019-06-28 VITALS
HEIGHT: 63 IN | BODY MASS INDEX: 24.12 KG/M2 | OXYGEN SATURATION: 98 % | TEMPERATURE: 98.8 F | SYSTOLIC BLOOD PRESSURE: 120 MMHG | HEART RATE: 97 BPM | DIASTOLIC BLOOD PRESSURE: 60 MMHG

## 2019-06-28 DIAGNOSIS — F33.42 RECURRENT MAJOR DEPRESSIVE DISORDER, IN FULL REMISSION (HCC): ICD-10-CM

## 2019-06-28 DIAGNOSIS — M79.7 FIBROMYALGIA: Primary | ICD-10-CM

## 2019-06-28 PROCEDURE — 99214 OFFICE O/P EST MOD 30 MIN: CPT | Performed by: FAMILY MEDICINE

## 2019-06-28 RX ORDER — VANCOMYCIN HYDROCHLORIDE 10 G/100ML
INJECTION, POWDER, LYOPHILIZED, FOR SOLUTION INTRAVENOUS
COMMUNITY
Start: 2019-06-21 | End: 2019-06-28

## 2019-06-28 RX ORDER — ONDANSETRON 2 MG/ML
INJECTION INTRAMUSCULAR; INTRAVENOUS
COMMUNITY
Start: 2019-06-25 | End: 2021-02-01 | Stop reason: SDUPTHER

## 2019-06-28 RX ORDER — CLINDAMYCIN HYDROCHLORIDE 150 MG/1
CAPSULE ORAL
COMMUNITY
Start: 2019-06-25 | End: 2019-06-28

## 2019-06-28 RX ORDER — HYDROXYZINE PAMOATE 25 MG/1
CAPSULE ORAL
COMMUNITY
Start: 2019-06-25 | End: 2019-06-28

## 2019-06-28 RX ORDER — PREDNISONE 10 MG/1
TABLET ORAL
COMMUNITY
Start: 2019-06-25 | End: 2019-09-24

## 2019-06-28 NOTE — PROGRESS NOTES
Chief Complaint   Patient presents with   • Med Management     controlled med appt       Subjective   Charline Cook is a 43 y.o. female.     History of Present Illness   She was admitted to  last Monday through Friday for sepsis from urinary tract infection per pt. I do not have these records in the chart.   Went home with PICC. Returned to ED for rash related to IV vancomycin. abx were changed. This visit is available to me. She was having rash in the hospital but not bad until she got home. The rash is still clearing.  New Neurologist transverse myelitis T1-2, copper deficiency. Dr. Yobany Cohen.  He has her searching for the copper to supplement with. Now in the 100s. She feels great. Changed her mouth creams because of the zinc decreasing the   She is not on tube feeds she developed refeeding syndrome. She gained 40 lbs. Have her treating the nausea and vomiting. Still vomits or retches 2-3 times per day. She takes zofran and tigan. Related to the transverse myelitis she is wheelchair bound. So even with the improvement in the copper level neurology told her, per pt, she is unlikely to be able to walk ever again. She is in a new motorized wheelchair, she has gotten this chair this week.   She is still following with GI dysmotility, recent gastric emptying study that was normal. Still has the nausea and vomiting part but not the slow emptying. She is going to keep the PICC for IV hydration per pt. She can only eat about 4 bites of food twice daily.     Pain management  She is feeling very comfortable throughout the day on her current regimen. She also states her mood has been great. She takes oxycodone 7.5 mg 2 every 6 hours but did make the comment that sometimes she does not need all those in one day because she is feeling better. She takes the gabapentin and we went up on the cyclobenzaprine during the day so she takes it twice instead of once daily. The trazodone helps keep her hunger kick in and the zofran  "controls the n/v.     The following portions of the patient's history were reviewed and updated as appropriate: allergies, current medications, past medical history, past social history and problem list.    Review of Systems   Respiratory: Negative.    Cardiovascular: Negative.    Neurological: Negative.        Vitals:    06/28/19 1141   BP: 120/60   BP Location: Left arm   Patient Position: Sitting   Cuff Size: Adult   Pulse: 97   Temp: 98.8 °F (37.1 °C)   TempSrc: Oral   SpO2: 98%   Height: 158.8 cm (62.5\")       Objective   Physical Exam   Constitutional: She is oriented to person, place, and time. She appears well-nourished. No distress.   Eyes: Conjunctivae are normal. Right eye exhibits no discharge. Left eye exhibits no discharge. No scleral icterus.   Cardiovascular: Normal rate, regular rhythm, normal heart sounds and intact distal pulses. Exam reveals no gallop and no friction rub.   No murmur heard.  Pulmonary/Chest: Effort normal and breath sounds normal. No respiratory distress. She has no wheezes.   Musculoskeletal: She exhibits no edema.   Normal BUE movement and strength.    Neurological: She is alert and oriented to person, place, and time.   She has ability to lift BLE against gravity, against resistance not attempted.    Psychiatric: She has a normal mood and affect. Her behavior is normal.   Vitals reviewed.      Assessment/Plan   Charline was seen today for med management.    Diagnoses and all orders for this visit:    Fibromyalgia    Recurrent major depressive disorder, in full remission (CMS/HCC)    We discussed her current pain control and she is feeling very good. She is on a large dose of oxycodone and we will work to transition her to long acting opioids but have limited options because of her allergies and intolerances in her chart. Concern that this regimen will become less effective and risk with doses. She also mentioned that some days she does not need all the doses so we will have to " work out her need. Desired alternative would be to titrate down on her current dosing first but this is likely going to be challenging related to her historical need, experiences when in pain and the impact this has on her mood, n/v, quality of life. I feel she will be somewhat resistant to reduce dosing at this time as her medications are linked a lot to her feeling so good right now. She really doesn't want to change anything.     No changes today. We were not able to get her venlafaxine changes to formulary approved but she seems to be doing well.   Pt reported she was told she has absorption deficiency and so she is using her tube for her medication except zofran. She reported that her intestines are better with absorption her stomach does not absorb. She uses pill form in her tube for all medications and then IV form for her zofran.     We need to get her GI motility clinic records, neurology records from  of L Dr. Yobany Cohen, and  admission from 6/17-6/21.

## 2019-06-30 DIAGNOSIS — G43.009 MIGRAINE WITHOUT AURA AND WITHOUT STATUS MIGRAINOSUS, NOT INTRACTABLE: ICD-10-CM

## 2019-07-01 RX ORDER — TRAZODONE HYDROCHLORIDE 100 MG/1
TABLET ORAL
Qty: 60 TABLET | Refills: 0 | Status: SHIPPED | OUTPATIENT
Start: 2019-07-01 | End: 2019-07-01 | Stop reason: SDUPTHER

## 2019-07-01 RX ORDER — OXYCODONE AND ACETAMINOPHEN 7.5; 325 MG/1; MG/1
2 TABLET ORAL EVERY 6 HOURS PRN
Qty: 180 TABLET | Refills: 0 | Status: SHIPPED | OUTPATIENT
Start: 2019-07-01 | End: 2019-07-24 | Stop reason: SDUPTHER

## 2019-07-01 RX ORDER — RIZATRIPTAN BENZOATE 10 MG/1
TABLET, ORALLY DISINTEGRATING ORAL
Qty: 12 TABLET | Refills: 0 | Status: SHIPPED | OUTPATIENT
Start: 2019-07-01 | End: 2019-08-13 | Stop reason: SDUPTHER

## 2019-07-02 RX ORDER — TRAZODONE HYDROCHLORIDE 100 MG/1
TABLET ORAL
Qty: 180 TABLET | Refills: 0 | Status: SHIPPED | OUTPATIENT
Start: 2019-07-02 | End: 2020-01-14

## 2019-07-03 ENCOUNTER — TELEPHONE (OUTPATIENT)
Dept: FAMILY MEDICINE CLINIC | Facility: CLINIC | Age: 44
End: 2019-07-03

## 2019-07-03 NOTE — TELEPHONE ENCOUNTER
----- Message from Yvette Cooper MD sent at 6/29/2019  8:18 AM EDT -----  Regarding: records request please  Please request records.   We need to get her GI motility clinic records, neurology records from [facility] Dr[facility] Yobany Cohen, and  admission from 6/17-6/21.  Thanks

## 2019-07-03 NOTE — TELEPHONE ENCOUNTER
----- Message from Yvette Cooper MD sent at 6/29/2019  8:18 AM EDT -----  Regarding: records request please  Please request records.   We need to get her GI motility clinic records, neurology records from [facility] Dr. Yobany Cohen, and  admission from 6/17-6/21.  Thanks

## 2019-07-03 NOTE — TELEPHONE ENCOUNTER
Spoke with Dr. Cohen office and last time she was seen was in may and the note is not completed. Dr. Todd us currently closed in order to close charts. Requested when chart closed to fax copy to office.

## 2019-07-11 DIAGNOSIS — M79.7 FIBROMYALGIA: ICD-10-CM

## 2019-07-15 RX ORDER — GABAPENTIN 800 MG/1
TABLET ORAL
Qty: 120 TABLET | Refills: 0 | Status: SHIPPED | OUTPATIENT
Start: 2019-07-15 | End: 2019-08-13 | Stop reason: SDUPTHER

## 2019-07-15 RX ORDER — CYANOCOBALAMIN 1000 UG/ML
INJECTION, SOLUTION INTRAMUSCULAR; SUBCUTANEOUS
Qty: 30 ML | Refills: 0 | Status: SHIPPED | OUTPATIENT
Start: 2019-07-15 | End: 2020-10-20

## 2019-07-29 RX ORDER — OXYCODONE AND ACETAMINOPHEN 7.5; 325 MG/1; MG/1
2 TABLET ORAL EVERY 6 HOURS PRN
Qty: 180 TABLET | Refills: 0 | Status: SHIPPED | OUTPATIENT
Start: 2019-07-29 | End: 2019-08-23 | Stop reason: SDUPTHER

## 2019-08-13 DIAGNOSIS — G43.009 MIGRAINE WITHOUT AURA AND WITHOUT STATUS MIGRAINOSUS, NOT INTRACTABLE: ICD-10-CM

## 2019-08-13 DIAGNOSIS — M79.7 FIBROMYALGIA: ICD-10-CM

## 2019-08-14 RX ORDER — RIZATRIPTAN BENZOATE 10 MG/1
TABLET, ORALLY DISINTEGRATING ORAL
Qty: 12 TABLET | Refills: 2 | Status: SHIPPED | OUTPATIENT
Start: 2019-08-14 | End: 2019-12-09 | Stop reason: SDUPTHER

## 2019-08-14 RX ORDER — GABAPENTIN 800 MG/1
TABLET ORAL
Qty: 120 TABLET | Refills: 1 | Status: SHIPPED | OUTPATIENT
Start: 2019-08-14 | End: 2019-10-07 | Stop reason: SDUPTHER

## 2019-08-23 NOTE — TELEPHONE ENCOUNTER
Regarding: Prescription Question  Contact: [phone]  ----- Message from Mychart, Generic sent at 8/23/2019  1:44 PM EDT -----    Just calling in to request a refill on pain meds. I'd prefer to stay where we are due to a bad flare however I understand if we need to change them.   Anthony

## 2019-08-26 RX ORDER — OXYCODONE AND ACETAMINOPHEN 7.5; 325 MG/1; MG/1
2 TABLET ORAL EVERY 6 HOURS PRN
Qty: 180 TABLET | Refills: 0 | Status: SHIPPED | OUTPATIENT
Start: 2019-08-26 | End: 2019-09-20 | Stop reason: SDUPTHER

## 2019-09-06 DIAGNOSIS — F41.9 ANXIETY: ICD-10-CM

## 2019-09-06 RX ORDER — DULOXETIN HYDROCHLORIDE 60 MG/1
60 CAPSULE, DELAYED RELEASE ORAL DAILY
Qty: 90 CAPSULE | Refills: 0 | Status: SHIPPED | OUTPATIENT
Start: 2019-09-06 | End: 2019-12-02 | Stop reason: SDUPTHER

## 2019-09-06 RX ORDER — NORTRIPTYLINE HYDROCHLORIDE 25 MG/1
25 CAPSULE ORAL NIGHTLY
Qty: 90 CAPSULE | Refills: 0 | Status: SHIPPED | OUTPATIENT
Start: 2019-09-06 | End: 2019-12-02 | Stop reason: SDUPTHER

## 2019-09-09 ENCOUNTER — TELEPHONE (OUTPATIENT)
Dept: FAMILY MEDICINE CLINIC | Facility: CLINIC | Age: 44
End: 2019-09-09

## 2019-09-09 RX ORDER — OXYCODONE HYDROCHLORIDE 15 MG/1
15 TABLET ORAL
COMMUNITY
End: 2019-10-18

## 2019-09-09 RX ORDER — MELATONIN 10 MG
10 CAPSULE ORAL NIGHTLY PRN
COMMUNITY

## 2019-09-09 RX ORDER — LORAZEPAM 1 MG/1
1 TABLET ORAL 2 TIMES DAILY PRN
Qty: 10 TABLET | Refills: 0 | Status: SHIPPED | OUTPATIENT
Start: 2019-09-09 | End: 2019-09-24

## 2019-09-09 RX ORDER — LORAZEPAM 1 MG/1
1 TABLET ORAL
COMMUNITY
Start: 2019-09-05 | End: 2019-09-09 | Stop reason: SDUPTHER

## 2019-09-09 NOTE — TELEPHONE ENCOUNTER
Contacted this patient's pharmacy and they state that they patient refilled this medication off her profile from an old rx that she had that didn't have the new dose adj added to this. She received 90 tablets. She should have 45 days worth.

## 2019-09-20 NOTE — TELEPHONE ENCOUNTER
Regarding: Prescription Question  Contact: [phone]  ----- Message from Mychart, Generic sent at 9/20/2019 10:31 AM EDT -----    Just letting you know it's time to refill pain meds. Thank you

## 2019-09-22 RX ORDER — OXYCODONE AND ACETAMINOPHEN 7.5; 325 MG/1; MG/1
2 TABLET ORAL EVERY 6 HOURS PRN
Qty: 180 TABLET | Refills: 0 | Status: SHIPPED | OUTPATIENT
Start: 2019-09-22 | End: 2019-10-18 | Stop reason: SDUPTHER

## 2019-09-24 ENCOUNTER — OFFICE VISIT (OUTPATIENT)
Dept: FAMILY MEDICINE CLINIC | Facility: CLINIC | Age: 44
End: 2019-09-24

## 2019-09-24 VITALS
TEMPERATURE: 98.4 F | DIASTOLIC BLOOD PRESSURE: 82 MMHG | RESPIRATION RATE: 19 BRPM | HEIGHT: 63 IN | HEART RATE: 112 BPM | SYSTOLIC BLOOD PRESSURE: 124 MMHG | OXYGEN SATURATION: 99 % | BODY MASS INDEX: 24.12 KG/M2

## 2019-09-24 DIAGNOSIS — M25.50 ARTHRALGIA, UNSPECIFIED JOINT: Primary | ICD-10-CM

## 2019-09-24 DIAGNOSIS — F41.9 ANXIETY: ICD-10-CM

## 2019-09-24 DIAGNOSIS — M79.7 FIBROMYALGIA: ICD-10-CM

## 2019-09-24 PROCEDURE — 99213 OFFICE O/P EST LOW 20 MIN: CPT | Performed by: FAMILY MEDICINE

## 2019-09-24 RX ORDER — FOLIC ACID 1 MG/1
TABLET ORAL
COMMUNITY
Start: 2019-06-29 | End: 2022-01-27

## 2019-09-24 NOTE — PROGRESS NOTES
Chief Complaint   Patient presents with   • Med Management     controlled med appt        Subjective   Charline Cook is a 43 y.o. female.     History of Present Illness   Pain control  Talking about switching every 6 months with her prior pain doctor.   Said that she has received fentanyl in the NH and she did well with it.   The way she takes it is prn, not on schedule. She takes it 4 per day but changes the frequency based on her pain and takes one sometimes and 1.5 tablets. She takes it for her legs. Pain, sometimes not as bad and sometimes severe. She has a lot of variability during the day and week to week.   She does not have sensation below the belly button. When she goes she can't feel it. She is continent though and sometimes with the dumping syndrome she has accidents.   She has tried magnesium for her leg pain at night, said cramps and felt painful, restless. She took 250 mg BID and helped a lot. Seems to happen every two weeks, bad then good.   She has tolerated tramadol previously.   She also says the fentanyl has worked in the hospital.     Anxiety  She was suffering from severe anxiety and panic attacks surrounding her most recent hospitalization.   Was treated with lorazepam for this that really helped but only temporary given her high dose of opioids.   She was having crying spells and being so anxious that she would get acutely nauseated.  Found out in the hospital she was not getting her full dosing of venlafaxine and duloxetine and she was though to be having withdrawal. Says the panic and anxiety severity is completely improved now that she has been back on her regular dose of her regular medications.     The following portions of the patient's history were reviewed and updated as appropriate: allergies, current medications, past medical history, past social history and problem list.    Review of Systems   Respiratory: Negative.    Cardiovascular: Negative.    Neurological: Negative.   "      Vitals:    09/24/19 1208   BP: 124/82   BP Location: Left arm   Patient Position: Sitting   Cuff Size: Adult   Pulse: 112   Resp: 19   Temp: 98.4 °F (36.9 °C)   TempSrc: Oral   SpO2: 99%   Height: 158.8 cm (62.5\")       Objective   Physical Exam   Constitutional: She is oriented to person, place, and time. She appears well-nourished. No distress.   Wheelchair bound   Eyes: Conjunctivae are normal. No scleral icterus.   Pulmonary/Chest: Effort normal. No respiratory distress.   Neurological: She is alert and oriented to person, place, and time.   She lifts her legs with her arms to move them    Psychiatric: She has a normal mood and affect. Her behavior is normal.   Vitals reviewed.      Assessment/Plan   Charline was seen today for med management.    Diagnoses and all orders for this visit:    Arthralgia, unspecified joint    Fibromyalgia    Anxiety      We discussed again her pain regimen. Discussed potency of tramadol, oxycodone and fentanyl. Says she would be willing to try tramadol dosing for her less severe pain. She did take fentanyl again in the hospital and it worked well. She would like to avoid it at this time after learning how potent it was on the opioid scale. We discussed potentiall reducing the oxycodone gradually and using the tramadol she otherwise has a lot of intolerance/allergy to pain medications.     Anxiety problems improved on her home regimen. No more benzodiazepines which she was aware of.           "

## 2019-09-30 DIAGNOSIS — J98.01 BRONCHOSPASM: ICD-10-CM

## 2019-09-30 RX ORDER — ALBUTEROL SULFATE 90 UG/1
AEROSOL, METERED RESPIRATORY (INHALATION)
Qty: 90 G | Refills: 0 | OUTPATIENT
Start: 2019-09-30

## 2019-09-30 RX ORDER — LEVALBUTEROL INHALATION SOLUTION 0.63 MG/3ML
SOLUTION RESPIRATORY (INHALATION)
Qty: 72 ML | Refills: 0 | OUTPATIENT
Start: 2019-09-30

## 2019-09-30 RX ORDER — ALBUTEROL SULFATE 90 UG/1
AEROSOL, METERED RESPIRATORY (INHALATION)
Qty: 18 G | Refills: 0 | Status: SHIPPED | OUTPATIENT
Start: 2019-09-30 | End: 2019-10-24 | Stop reason: SDUPTHER

## 2019-10-01 RX ORDER — LEVALBUTEROL INHALATION SOLUTION 0.63 MG/3ML
1 SOLUTION RESPIRATORY (INHALATION) EVERY 4 HOURS PRN
Qty: 135 ML | Refills: 2 | Status: SHIPPED | OUTPATIENT
Start: 2019-10-01

## 2019-10-01 RX ORDER — LEVALBUTEROL INHALATION SOLUTION 0.63 MG/3ML
SOLUTION RESPIRATORY (INHALATION)
Qty: 1650 ML | Refills: 2 | OUTPATIENT
Start: 2019-10-01

## 2019-10-07 DIAGNOSIS — M79.7 FIBROMYALGIA: ICD-10-CM

## 2019-10-08 RX ORDER — GABAPENTIN 800 MG/1
TABLET ORAL
Qty: 120 TABLET | Refills: 0 | Status: SHIPPED | OUTPATIENT
Start: 2019-10-08 | End: 2019-11-07 | Stop reason: SDUPTHER

## 2019-10-08 NOTE — TELEPHONE ENCOUNTER
LOV: 9/24/2019    NOV: 12/17/2019    KHUSHBOO last ran: 9/9/2019    Contract BLAYNE    Last written on 8/14/2019

## 2019-10-18 RX ORDER — OXYCODONE AND ACETAMINOPHEN 7.5; 325 MG/1; MG/1
2 TABLET ORAL EVERY 6 HOURS PRN
Qty: 180 TABLET | Refills: 0 | Status: SHIPPED | OUTPATIENT
Start: 2019-10-18 | End: 2020-01-10

## 2019-10-25 RX ORDER — ALBUTEROL SULFATE 90 UG/1
AEROSOL, METERED RESPIRATORY (INHALATION)
Qty: 18 G | Refills: 0 | Status: SHIPPED | OUTPATIENT
Start: 2019-10-25 | End: 2019-12-09 | Stop reason: SDUPTHER

## 2019-10-25 RX ORDER — CYCLOBENZAPRINE HCL 10 MG
10 TABLET ORAL NIGHTLY PRN
Qty: 90 TABLET | Refills: 0 | OUTPATIENT
Start: 2019-10-25

## 2019-10-25 RX ORDER — CYCLOBENZAPRINE HCL 10 MG
10 TABLET ORAL 2 TIMES DAILY PRN
Qty: 180 TABLET | Refills: 0 | Status: SHIPPED | OUTPATIENT
Start: 2019-10-25 | End: 2020-01-20

## 2019-10-25 RX ORDER — TRAZODONE HYDROCHLORIDE 100 MG/1
TABLET ORAL
Qty: 60 TABLET | Refills: 0 | Status: SHIPPED | OUTPATIENT
Start: 2019-10-25 | End: 2019-11-18 | Stop reason: SDUPTHER

## 2019-11-04 ENCOUNTER — TELEPHONE (OUTPATIENT)
Dept: FAMILY MEDICINE CLINIC | Facility: CLINIC | Age: 44
End: 2019-11-04

## 2019-11-04 NOTE — TELEPHONE ENCOUNTER
Pt  calling. Pt  would like to increase pain medicine. Pt  states she is back in a dark place. Dayron state to call him back to discuss in details what is going on with his wife.

## 2019-11-06 ENCOUNTER — TELEPHONE (OUTPATIENT)
Dept: FAMILY MEDICINE CLINIC | Facility: CLINIC | Age: 44
End: 2019-11-06

## 2019-11-06 DIAGNOSIS — R79.1 ELEVATED FACTOR VIII LEVEL: Primary | ICD-10-CM

## 2019-11-06 NOTE — TELEPHONE ENCOUNTER
Called and left message for pt. I have reached out to pain management specialist to get some guidance on transitioning her to a long acting pain medication. I will call pt to discuss this plan as soon as possible.       Patients  is calling saying her pain intensity is to the max, and she is ready to try MS Contin. They said it would be difficult for her to come in to the office due to pain but they are willing if they need to. His phone number is [phone] (Oz - ) Her phone number is [phone]. [facility]. Her  would like to receive a call either way, if she's able to receive the medication or not. Thank you.

## 2019-11-07 DIAGNOSIS — M79.7 FIBROMYALGIA: ICD-10-CM

## 2019-11-07 RX ORDER — GABAPENTIN 800 MG/1
TABLET ORAL
Qty: 120 TABLET | Refills: 0 | Status: SHIPPED | OUTPATIENT
Start: 2019-11-07 | End: 2019-12-04 | Stop reason: SDUPTHER

## 2019-11-07 RX ORDER — OXYCODONE AND ACETAMINOPHEN 7.5; 325 MG/1; MG/1
TABLET ORAL
Qty: 7 TABLET | Refills: 0 | Status: SHIPPED | OUTPATIENT
Start: 2019-11-07 | End: 2019-11-08 | Stop reason: SDUPTHER

## 2019-11-08 RX ORDER — OXYCODONE AND ACETAMINOPHEN 7.5; 325 MG/1; MG/1
TABLET ORAL
Qty: 13 TABLET | Refills: 0 | Status: SHIPPED | OUTPATIENT
Start: 2019-11-08 | End: 2020-01-10

## 2019-11-11 ENCOUNTER — TELEPHONE (OUTPATIENT)
Dept: FAMILY MEDICINE CLINIC | Facility: CLINIC | Age: 44
End: 2019-11-11

## 2019-11-11 NOTE — TELEPHONE ENCOUNTER
Spoke with pt about the transition to long-acting and more appropriately seeing pain management for this treatment management. I spoke with Dr. Destini Miller who offered to see the pt to help with her pain control. Discussed with pt and she thinks this is a great idea. In the mean time Dr. Miller said it would be appropriate since the pt is not opiate naive to give her a fifth dose of 15 mg of oxycodone each day during this crisis. I will send this dosing for the next 4 days and for all day on Monday, the day she should be scheduled with Dr. Miller. Pt voices understanding and is agreeable.

## 2019-11-19 RX ORDER — TRAZODONE HYDROCHLORIDE 100 MG/1
TABLET ORAL
Qty: 60 TABLET | Refills: 0 | Status: SHIPPED | OUTPATIENT
Start: 2019-11-19 | End: 2019-12-19

## 2019-12-02 DIAGNOSIS — F41.9 ANXIETY: ICD-10-CM

## 2019-12-02 RX ORDER — DULOXETIN HYDROCHLORIDE 60 MG/1
60 CAPSULE, DELAYED RELEASE ORAL DAILY
Qty: 90 CAPSULE | Refills: 0 | Status: SHIPPED | OUTPATIENT
Start: 2019-12-02 | End: 2020-03-02

## 2019-12-02 RX ORDER — NORTRIPTYLINE HYDROCHLORIDE 25 MG/1
25 CAPSULE ORAL NIGHTLY
Qty: 90 CAPSULE | Refills: 0 | Status: SHIPPED | OUTPATIENT
Start: 2019-12-02 | End: 2020-03-02

## 2019-12-03 RX ORDER — VENLAFAXINE 75 MG/1
225 TABLET ORAL DAILY
Qty: 270 TABLET | Refills: 0 | Status: SHIPPED | OUTPATIENT
Start: 2019-12-03 | End: 2020-03-25

## 2019-12-04 DIAGNOSIS — G43.009 MIGRAINE WITHOUT AURA AND WITHOUT STATUS MIGRAINOSUS, NOT INTRACTABLE: ICD-10-CM

## 2019-12-04 DIAGNOSIS — M79.7 FIBROMYALGIA: ICD-10-CM

## 2019-12-09 DIAGNOSIS — G43.009 MIGRAINE WITHOUT AURA AND WITHOUT STATUS MIGRAINOSUS, NOT INTRACTABLE: ICD-10-CM

## 2019-12-09 RX ORDER — RIZATRIPTAN BENZOATE 10 MG/1
10 TABLET, ORALLY DISINTEGRATING ORAL ONCE AS NEEDED
Qty: 12 TABLET | Refills: 2 | Status: SHIPPED | OUTPATIENT
Start: 2019-12-09 | End: 2020-05-07

## 2019-12-09 RX ORDER — ALBUTEROL SULFATE 90 UG/1
2 AEROSOL, METERED RESPIRATORY (INHALATION) EVERY 4 HOURS PRN
Qty: 18 G | Refills: 0 | Status: SHIPPED | OUTPATIENT
Start: 2019-12-09 | End: 2020-03-25

## 2019-12-10 RX ORDER — GABAPENTIN 800 MG/1
TABLET ORAL
Qty: 120 TABLET | Refills: 0 | Status: SHIPPED | OUTPATIENT
Start: 2019-12-10 | End: 2020-01-14

## 2019-12-10 RX ORDER — RIZATRIPTAN BENZOATE 10 MG/1
TABLET, ORALLY DISINTEGRATING ORAL
Qty: 12 TABLET | Refills: 0 | OUTPATIENT
Start: 2019-12-10

## 2019-12-10 RX ORDER — ALBUTEROL SULFATE 90 UG/1
AEROSOL, METERED RESPIRATORY (INHALATION)
Qty: 18 G | Refills: 0 | OUTPATIENT
Start: 2019-12-10

## 2019-12-11 ENCOUNTER — TELEPHONE (OUTPATIENT)
Dept: FAMILY MEDICINE CLINIC | Facility: CLINIC | Age: 44
End: 2019-12-11

## 2019-12-11 RX ORDER — SULFAMETHOXAZOLE AND TRIMETHOPRIM 400; 80 MG/1; MG/1
1 TABLET ORAL 2 TIMES DAILY
Qty: 14 TABLET | Refills: 0 | Status: SHIPPED | OUTPATIENT
Start: 2019-12-11 | End: 2020-01-10

## 2019-12-11 NOTE — TELEPHONE ENCOUNTER
Dr. Esquivel called stating patient had been admitted to the hospital and was discharged home. Patient urine culture has grew out + and she would need to be started on a antibiotic. I have spoken with Dr. Cooper and she will be sending in a antibiotic. I have called and left a message for the patient stating the above information and that it was important for her to  the medication. I have asked the patient to call the office back to verify she received the message.

## 2019-12-12 ENCOUNTER — TELEPHONE (OUTPATIENT)
Dept: FAMILY MEDICINE CLINIC | Facility: CLINIC | Age: 44
End: 2019-12-12

## 2019-12-12 NOTE — TELEPHONE ENCOUNTER
Was able to get ahold of the  and advise him. He states out of town in [place] and will get ahold of his daughter to take bola to [facility].

## 2019-12-12 NOTE — TELEPHONE ENCOUNTER
Spoke with triage Ev. Informed her a little about pt medical hx and she had + blood culture yesterday, told to go to hospital. She is well known at [facility].

## 2019-12-19 RX ORDER — TRAZODONE HYDROCHLORIDE 100 MG/1
TABLET ORAL
Qty: 60 TABLET | Refills: 0 | Status: SHIPPED | OUTPATIENT
Start: 2019-12-19 | End: 2020-01-10 | Stop reason: SDUPTHER

## 2019-12-20 ENCOUNTER — TELEPHONE (OUTPATIENT)
Dept: FAMILY MEDICINE CLINIC | Facility: CLINIC | Age: 44
End: 2019-12-20

## 2019-12-20 NOTE — TELEPHONE ENCOUNTER
Can you please schedule this patient in a same day slot ANY DAY BUT THE 1/7/2020. She will need 30 mins

## 2019-12-20 NOTE — TELEPHONE ENCOUNTER
Pt was discharged from the hospital from septic shock and needs a follow up appt. Pt spouse wants to know if we can work her in on 01/07/19 in the after if possible

## 2020-01-03 ENCOUNTER — APPOINTMENT (OUTPATIENT)
Dept: OTHER | Facility: HOSPITAL | Age: 45
End: 2020-01-03

## 2020-01-03 ENCOUNTER — APPOINTMENT (OUTPATIENT)
Dept: ONCOLOGY | Facility: CLINIC | Age: 45
End: 2020-01-03

## 2020-01-10 ENCOUNTER — OFFICE VISIT (OUTPATIENT)
Dept: FAMILY MEDICINE CLINIC | Facility: CLINIC | Age: 45
End: 2020-01-10

## 2020-01-10 ENCOUNTER — APPOINTMENT (OUTPATIENT)
Dept: LAB | Facility: HOSPITAL | Age: 45
End: 2020-01-10

## 2020-01-10 VITALS
OXYGEN SATURATION: 98 % | DIASTOLIC BLOOD PRESSURE: 82 MMHG | RESPIRATION RATE: 18 BRPM | HEART RATE: 113 BPM | SYSTOLIC BLOOD PRESSURE: 110 MMHG | TEMPERATURE: 99.1 F | HEIGHT: 63 IN | BODY MASS INDEX: 24.12 KG/M2

## 2020-01-10 DIAGNOSIS — R73.9 HYPERGLYCEMIA: ICD-10-CM

## 2020-01-10 DIAGNOSIS — D72.829 LEUKOCYTOSIS, UNSPECIFIED TYPE: Primary | ICD-10-CM

## 2020-01-10 PROBLEM — E11.9 DM (DIABETES MELLITUS): Status: ACTIVE | Noted: 2019-12-14

## 2020-01-10 PROBLEM — R74.8 ABNORMAL TRANSAMINASES: Status: ACTIVE | Noted: 2019-12-14

## 2020-01-10 LAB
D-LACTATE SERPL-SCNC: 0.7 MMOL/L (ref 0.5–2)
HBA1C MFR BLD: 5.18 % (ref 4.8–5.6)

## 2020-01-10 PROCEDURE — 83036 HEMOGLOBIN GLYCOSYLATED A1C: CPT | Performed by: FAMILY MEDICINE

## 2020-01-10 PROCEDURE — 99214 OFFICE O/P EST MOD 30 MIN: CPT | Performed by: FAMILY MEDICINE

## 2020-01-10 PROCEDURE — 36415 COLL VENOUS BLD VENIPUNCTURE: CPT | Performed by: FAMILY MEDICINE

## 2020-01-10 PROCEDURE — 83605 ASSAY OF LACTIC ACID: CPT | Performed by: FAMILY MEDICINE

## 2020-01-10 RX ORDER — CEFEPIME HYDROCHLORIDE 2 G/1
INJECTION, POWDER, FOR SOLUTION INTRAVENOUS
COMMUNITY
Start: 2019-12-18 | End: 2020-01-10

## 2020-01-10 RX ORDER — SODIUM CHLORIDE AND POTASSIUM CHLORIDE 150; 900 MG/100ML; MG/100ML
INJECTION, SOLUTION INTRAVENOUS
COMMUNITY
Start: 2019-11-22 | End: 2023-01-17

## 2020-01-10 RX ORDER — NYSTATIN 500000 [USP'U]/1
TABLET, COATED ORAL
COMMUNITY
End: 2020-01-10

## 2020-01-10 RX ORDER — OXYCODONE AND ACETAMINOPHEN 10; 325 MG/1; MG/1
TABLET ORAL
COMMUNITY
End: 2020-01-10

## 2020-01-10 RX ORDER — FENTANYL 25 UG/H
PATCH TRANSDERMAL
COMMUNITY
Start: 2019-11-19 | End: 2020-01-10 | Stop reason: DRUGHIGH

## 2020-01-10 RX ORDER — FENTANYL 50 UG/H
PATCH TRANSDERMAL
COMMUNITY
Start: 2019-12-11 | End: 2021-09-10 | Stop reason: ALTCHOICE

## 2020-01-10 RX ORDER — LEVOFLOXACIN 750 MG/1
TABLET ORAL
COMMUNITY
End: 2020-01-10

## 2020-01-10 RX ORDER — BUDESONIDE AND FORMOTEROL FUMARATE DIHYDRATE 160; 4.5 UG/1; UG/1
2 AEROSOL RESPIRATORY (INHALATION)
Qty: 10.2 G | Refills: 2 | Status: SHIPPED | OUTPATIENT
Start: 2020-01-10 | End: 2022-01-27 | Stop reason: SDUPTHER

## 2020-01-10 RX ORDER — LORAZEPAM 0.5 MG/1
TABLET ORAL
COMMUNITY
Start: 2019-12-17 | End: 2021-02-01

## 2020-01-10 RX ORDER — IPRATROPIUM BROMIDE AND ALBUTEROL SULFATE 2.5; .5 MG/3ML; MG/3ML
SOLUTION RESPIRATORY (INHALATION)
COMMUNITY
Start: 2019-12-09 | End: 2020-01-10 | Stop reason: SDUPTHER

## 2020-01-10 RX ORDER — DICYCLOMINE HCL 20 MG
TABLET ORAL
COMMUNITY
End: 2021-02-01

## 2020-01-10 RX ORDER — PREDNISONE 20 MG/1
TABLET ORAL
COMMUNITY
Start: 2019-12-09 | End: 2020-01-18

## 2020-01-10 RX ORDER — FENTANYL 12 UG/H
PATCH TRANSDERMAL
COMMUNITY
Start: 2019-11-11 | End: 2020-01-10 | Stop reason: DRUGHIGH

## 2020-01-10 RX ORDER — OXYCODONE HYDROCHLORIDE 15 MG/1
15 TABLET ORAL
COMMUNITY
End: 2020-01-10

## 2020-01-10 RX ORDER — ALTEPLASE 2.2 MG/2ML
INJECTION, POWDER, LYOPHILIZED, FOR SOLUTION INTRAVENOUS
COMMUNITY
Start: 2019-10-29

## 2020-01-10 RX ORDER — ONDANSETRON 2 MG/ML
8 INJECTION INTRAMUSCULAR; INTRAVENOUS
COMMUNITY
Start: 2019-12-17 | End: 2023-01-17

## 2020-01-10 RX ORDER — HYDROCODONE BITARTRATE AND ACETAMINOPHEN 5; 325 MG/1; MG/1
1 TABLET ORAL EVERY 8 HOURS PRN
COMMUNITY
Start: 2020-01-03 | End: 2021-08-27

## 2020-01-10 NOTE — PROGRESS NOTES
Chief Complaint   Patient presents with   • Blood Infection     hospital follow up        Subjective   Charline Cook is a 44 y.o. female.     History of Present Illness   Hospital follow up  She was hospitalized for sepsis. Was seen in ED for worsening respiratory illness SOB and thought she might have pneumonia. They jan blood for labs including blood culture that came back positive and she was sent to the ED. Found to have infected port and this was changed and replaced prior to discharge.  She is on TPN and going into the port and feels better on it.   Helps her energy and belly symptoms are better. She is feeling like she is getting better. She has her TPN going 18 hours out of the day.     Has been following with pain management. They have been using fentanyl for better pain control continuously. She has been decreased on her oxycodone and off and has been transitioned to hydrocodone and weaned down from that. She is pretty comfortable with the current regimen. She is nervous as when she saw the NP at pain they talked about going off the hydrocodone and pt feels like she still needs to be on this dose at this time. She feels like this is such a good thing and that she has her quality of life back.   She is on 50 mcg of fentanyl.  5 mg hydrocodone mg 4 times per day.     Stopped copper briefly because it was toxicity level. They are monitoring and has restarted. She did walk across a deck at a house they were looking at to buy. Said she was feeling really good that day and she was slow but she made it across the deck. She has not done since. She is wondering about doing PT again but feels like it can be problematic because of the timing and time commitment and how long her TPN takes.     Leukocytosis  Her white count was really high in the hospital with the sepsis. She said her lab called and said her white count went up this week and she is nervous about getting the infection again. Was precautioned at  "discharge that this infection because she has line and on parenteral nutrition can be risk for recurrence. She has been feeling well, no fever but she was not feeling back at presentation to the hospital after positive blood culture either.     Also has diabetes as a new dx from her recent hospitalization but had not had hyperglycemia before.   Could be a problem with TPN.     The following portions of the patient's history were reviewed and updated as appropriate: allergies, current medications, past medical history, past social history and problem list.    Review of Systems   Respiratory: Negative.    Cardiovascular: Negative.    Neurological: Negative.        /82 (BP Location: Left arm, Patient Position: Sitting, Cuff Size: Adult)   Pulse 113   Temp 99.1 °F (37.3 °C) (Oral)   Resp 18   Ht 158.8 cm (62.5\")   LMP  (LMP Unknown)   SpO2 98%   Breastfeeding No   BMI 24.12 kg/m²       Objective   Physical Exam   Constitutional: She is oriented to person, place, and time. She appears well-nourished. No distress.   Eyes: Conjunctivae are normal. Right eye exhibits no discharge. Left eye exhibits no discharge. No scleral icterus.   Cardiovascular: Normal rate, regular rhythm, normal heart sounds and intact distal pulses. Exam reveals no gallop and no friction rub.   No murmur heard.  Pulmonary/Chest: Effort normal and breath sounds normal. No respiratory distress. She has no wheezes.   Musculoskeletal: She exhibits no edema.   She is doing well with leg lifting from her chair. She is better with the right.    Neurological: She is alert and oriented to person, place, and time.   Psychiatric: She has a normal mood and affect. Her behavior is normal.   Vitals reviewed.      Assessment/Plan   Charline was seen today for blood infection.    Diagnoses and all orders for this visit:    Leukocytosis, unspecified type  -     Lactic Acid, Plasma    Hyperglycemia  -     Hemoglobin A1c    Other orders  -     " budesonide-formoterol (SYMBICORT) 160-4.5 MCG/ACT inhaler; Inhale 2 puffs 2 (Two) Times a Day.      Because of her concerns with the elevated WBC with recent lab results and her concern for recurrence, no major symptoms with prior sepsis we will check her LA as this was also trended and followed her clinical course in the hospital.   Will also get A1c to check in on the dx of diabetes.   With the recent illness and needing her albuterol so much during the day for relief I recommended she go back on her ICS-LABA right now. She is agreeable, will need refill.

## 2020-01-12 DIAGNOSIS — M79.7 FIBROMYALGIA: ICD-10-CM

## 2020-01-14 RX ORDER — GABAPENTIN 800 MG/1
TABLET ORAL
Qty: 120 TABLET | Refills: 1 | Status: SHIPPED | OUTPATIENT
Start: 2020-01-14 | End: 2020-03-16

## 2020-01-14 RX ORDER — TRAZODONE HYDROCHLORIDE 100 MG/1
TABLET ORAL
Qty: 60 TABLET | Refills: 1 | Status: SHIPPED | OUTPATIENT
Start: 2020-01-14 | End: 2020-03-04

## 2020-01-20 RX ORDER — CYCLOBENZAPRINE HCL 10 MG
TABLET ORAL
Qty: 180 TABLET | Refills: 0 | Status: SHIPPED | OUTPATIENT
Start: 2020-01-20 | End: 2020-04-16

## 2020-01-21 ENCOUNTER — APPOINTMENT (OUTPATIENT)
Dept: OTHER | Facility: HOSPITAL | Age: 45
End: 2020-01-21

## 2020-02-11 ENCOUNTER — APPOINTMENT (OUTPATIENT)
Dept: OTHER | Facility: HOSPITAL | Age: 45
End: 2020-02-11

## 2020-03-01 DIAGNOSIS — F41.9 ANXIETY: ICD-10-CM

## 2020-03-02 RX ORDER — NORTRIPTYLINE HYDROCHLORIDE 25 MG/1
25 CAPSULE ORAL NIGHTLY
Qty: 90 CAPSULE | Refills: 0 | Status: SHIPPED | OUTPATIENT
Start: 2020-03-02 | End: 2020-03-04

## 2020-03-02 RX ORDER — DULOXETIN HYDROCHLORIDE 60 MG/1
60 CAPSULE, DELAYED RELEASE ORAL DAILY
Qty: 90 CAPSULE | Refills: 0 | Status: SHIPPED | OUTPATIENT
Start: 2020-03-02 | End: 2020-06-02

## 2020-03-04 RX ORDER — TRAZODONE HYDROCHLORIDE 100 MG/1
TABLET ORAL
Qty: 60 TABLET | Refills: 1 | Status: SHIPPED | OUTPATIENT
Start: 2020-03-04 | End: 2020-04-16

## 2020-03-04 RX ORDER — NORTRIPTYLINE HYDROCHLORIDE 25 MG/1
25 CAPSULE ORAL NIGHTLY
Qty: 90 CAPSULE | Refills: 1 | Status: SHIPPED | OUTPATIENT
Start: 2020-03-04 | End: 2020-12-21 | Stop reason: SDUPTHER

## 2020-03-14 DIAGNOSIS — M79.7 FIBROMYALGIA: ICD-10-CM

## 2020-03-16 RX ORDER — GABAPENTIN 800 MG/1
TABLET ORAL
Qty: 120 TABLET | Refills: 1 | Status: SHIPPED | OUTPATIENT
Start: 2020-03-16 | End: 2020-06-19 | Stop reason: SDUPTHER

## 2020-03-25 RX ORDER — ALBUTEROL SULFATE 90 UG/1
AEROSOL, METERED RESPIRATORY (INHALATION)
Qty: 18 G | Refills: 2 | Status: SHIPPED | OUTPATIENT
Start: 2020-03-25 | End: 2022-01-27 | Stop reason: SDUPTHER

## 2020-03-25 RX ORDER — VENLAFAXINE 75 MG/1
225 TABLET ORAL DAILY
Qty: 270 TABLET | Refills: 0 | Status: SHIPPED | OUTPATIENT
Start: 2020-03-25 | End: 2020-07-13

## 2020-04-16 RX ORDER — CYCLOBENZAPRINE HCL 10 MG
TABLET ORAL
Qty: 180 TABLET | Refills: 0 | Status: SHIPPED | OUTPATIENT
Start: 2020-04-16 | End: 2020-07-13

## 2020-04-16 RX ORDER — TRAZODONE HYDROCHLORIDE 100 MG/1
TABLET ORAL
Qty: 180 TABLET | Refills: 1 | Status: SHIPPED | OUTPATIENT
Start: 2020-04-16 | End: 2020-09-15

## 2020-04-17 ENCOUNTER — TELEPHONE (OUTPATIENT)
Dept: FAMILY MEDICINE CLINIC | Facility: CLINIC | Age: 45
End: 2020-04-17

## 2020-04-17 NOTE — TELEPHONE ENCOUNTER
----- Message from Charline Cook sent at 4/16/2020  6:55 PM EDT -----  Regarding: Non-Urgent Medical Question  Contact: [phone]  Dr Cooper    I submitted a few prescriptions for refills however it wouldn't let me refill the gabapentin. We have a new [employer] now since we have moved into our new home.  Below is their information if you could change in your system.     Walallie  [facility] [facility] [address] [phone] phone.

## 2020-04-20 ENCOUNTER — TELEMEDICINE (OUTPATIENT)
Dept: FAMILY MEDICINE CLINIC | Facility: CLINIC | Age: 45
End: 2020-04-20

## 2020-04-20 VITALS — TEMPERATURE: 100.1 F

## 2020-04-20 DIAGNOSIS — R50.9 FEVER, UNSPECIFIED FEVER CAUSE: Primary | ICD-10-CM

## 2020-04-20 PROBLEM — A41.02 SEPSIS DUE TO METHICILLIN RESISTANT STAPHYLOCOCCUS AUREUS (MRSA) WITHOUT ACUTE ORGAN DYSFUNCTION (HCC): Status: ACTIVE | Noted: 2019-12-14

## 2020-04-20 PROCEDURE — 99441 PR PHYS/QHP TELEPHONE EVALUATION 5-10 MIN: CPT | Performed by: FAMILY MEDICINE

## 2020-04-20 RX ORDER — ONDANSETRON 8 MG/1
TABLET, ORALLY DISINTEGRATING ORAL
COMMUNITY
Start: 2020-03-30 | End: 2021-02-01 | Stop reason: SDUPTHER

## 2020-04-20 RX ORDER — PANTOPRAZOLE SODIUM 40 MG/1
40 TABLET, DELAYED RELEASE ORAL 2 TIMES DAILY
COMMUNITY
Start: 2020-02-04

## 2020-04-20 RX ORDER — NALOXONE HYDROCHLORIDE 4 MG/.1ML
SPRAY NASAL
COMMUNITY
Start: 2020-02-12 | End: 2023-01-17 | Stop reason: SDUPTHER

## 2020-04-20 NOTE — PROGRESS NOTES
Subjective   Charline Cook is a 44 y.o. female.     Chief Complaint   Patient presents with   • Fever        History of Present Illness  Fever, new today  Fever and pain in her lungs. Had a rough night. Says feels like pna when she has had pna. She developed the pain posteriorly/back overnight.  Temp 101.4 F about one hour ago and now 100.1 after taking ibuprofen.   A little SOB, she is wheezing. She is not having a lot of other respiratory symptoms.  She has not been using inhalers last couple of days. Used albuterol a couple of days ago.   Other symptoms include nausea and vomiting for the last one week. She had nausea and vomiting last night.  Had admission for MSSA sepsis/probably septic pulmonary emboli early Feb. She did have follow up with ID Dr. Quigley 3/25/20 and is supposed to call ID office if she has a fever above 101.     The following portions of the patient's history were reviewed and updated as appropriate: allergies, current medications, past medical history, past social history and problem list.    Review of Systems   Constitutional: Positive for fever.   HENT: Negative for congestion and rhinorrhea.    Respiratory: Positive for wheezing. Negative for shortness of breath.    Gastrointestinal: Positive for nausea and vomiting.   Musculoskeletal: Positive for back pain.   Psychiatric/Behavioral: Positive for sleep disturbance.       Objective   Temp 100.1 °F (37.8 °C)   LMP  (LMP Unknown)   Physical Exam  Pt connected via zoom but did not connect to video, audio only   Assessment/Plan   Charline was seen today for fever.    Diagnoses and all orders for this visit:    Fever, unspecified fever cause      Concern given her history of multiple complicated infections including sepsis x 2 in less than 6 months.   She can call her ID doctor office to check in because of the fever but concern that she needs to go ahead to hospital. Not to wait for hours for office response in case they are not open or operating  at full capacity during the pandemic. Do not delay care. I advised to go to the hospital where her doctors can see her and she is best known by the providers given her complicated history and numerous specialists.   Pt and her  voiced understanding.   Patient gave consent today for a telehealth video visit but only connected with audio as following recommendations of our governor and CDC during the COVID-19 pandemic.    10 min was spent in discussion with pt and greater than 50% of that time was spent counseling.

## 2020-05-01 ENCOUNTER — TELEPHONE (OUTPATIENT)
Dept: FAMILY MEDICINE CLINIC | Facility: CLINIC | Age: 45
End: 2020-05-01

## 2020-05-01 NOTE — TELEPHONE ENCOUNTER
IN THE PATIENTS CHART SHE IS LISTED AS BEING DIABETIC BUT THE PT STATES SHE DOES NOT HAVE DIABETES. SHE NEEDS CLARIFICATION ON THE PT DIAGNOSIS.

## 2020-05-06 DIAGNOSIS — G43.009 MIGRAINE WITHOUT AURA AND WITHOUT STATUS MIGRAINOSUS, NOT INTRACTABLE: ICD-10-CM

## 2020-05-07 RX ORDER — RIZATRIPTAN BENZOATE 10 MG/1
TABLET, ORALLY DISINTEGRATING ORAL
Qty: 12 TABLET | Refills: 2 | Status: SHIPPED | OUTPATIENT
Start: 2020-05-07 | End: 2020-10-20

## 2020-05-30 DIAGNOSIS — F41.9 ANXIETY: ICD-10-CM

## 2020-06-02 RX ORDER — DULOXETIN HYDROCHLORIDE 60 MG/1
60 CAPSULE, DELAYED RELEASE ORAL DAILY
Qty: 90 CAPSULE | Refills: 0 | Status: SHIPPED | OUTPATIENT
Start: 2020-06-02 | End: 2020-10-22

## 2020-06-04 DIAGNOSIS — F41.9 ANXIETY: ICD-10-CM

## 2020-06-04 RX ORDER — DULOXETIN HYDROCHLORIDE 60 MG/1
60 CAPSULE, DELAYED RELEASE ORAL DAILY
Qty: 90 CAPSULE | Refills: 0 | OUTPATIENT
Start: 2020-06-04

## 2020-06-17 DIAGNOSIS — M79.7 FIBROMYALGIA: ICD-10-CM

## 2020-06-17 RX ORDER — GABAPENTIN 800 MG/1
800 TABLET ORAL 4 TIMES DAILY
Qty: 120 TABLET | Refills: 1 | Status: CANCELLED | OUTPATIENT
Start: 2020-06-17

## 2020-06-17 NOTE — TELEPHONE ENCOUNTER
OF PATIENT CALLED TO REQUEST REFILLS OF    gabapentin (NEURONTIN) 800 MG tablet     [id number] - [place], [place] - [address] AT [facility] OF [address] [address] [address] - [phone] [facility] [phone] FX

## 2020-06-19 DIAGNOSIS — M79.7 FIBROMYALGIA: ICD-10-CM

## 2020-06-19 RX ORDER — GABAPENTIN 800 MG/1
800 TABLET ORAL 4 TIMES DAILY
Qty: 120 TABLET | Refills: 1 | Status: SHIPPED | OUTPATIENT
Start: 2020-06-19 | End: 2020-08-27

## 2020-07-13 RX ORDER — CYCLOBENZAPRINE HCL 10 MG
TABLET ORAL
Qty: 180 TABLET | Refills: 0 | Status: SHIPPED | OUTPATIENT
Start: 2020-07-13 | End: 2020-09-25

## 2020-07-13 RX ORDER — VENLAFAXINE 75 MG/1
225 TABLET ORAL DAILY
Qty: 270 TABLET | Refills: 0 | Status: SHIPPED | OUTPATIENT
Start: 2020-07-13 | End: 2020-10-22

## 2020-08-17 ENCOUNTER — TELEMEDICINE (OUTPATIENT)
Dept: FAMILY MEDICINE CLINIC | Facility: CLINIC | Age: 45
End: 2020-08-17

## 2020-08-17 DIAGNOSIS — F32.A ANXIETY AND DEPRESSION: Primary | ICD-10-CM

## 2020-08-17 DIAGNOSIS — F41.9 ANXIETY AND DEPRESSION: Primary | ICD-10-CM

## 2020-08-17 PROBLEM — K21.9 GERD (GASTROESOPHAGEAL REFLUX DISEASE): Status: ACTIVE | Noted: 2020-08-17

## 2020-08-17 PROBLEM — I51.89 DIASTOLIC DYSFUNCTION: Status: ACTIVE | Noted: 2020-07-21

## 2020-08-17 PROBLEM — G82.20 PARAPLEGIA (HCC): Status: ACTIVE | Noted: 2020-04-21

## 2020-08-17 PROBLEM — T82.594A: Status: ACTIVE | Noted: 2020-07-21

## 2020-08-17 PROBLEM — B95.61 MSSA BACTEREMIA: Status: ACTIVE | Noted: 2020-07-23

## 2020-08-17 PROBLEM — R78.81 MSSA BACTEREMIA: Status: ACTIVE | Noted: 2020-07-23

## 2020-08-17 PROBLEM — A41.9 SEPSIS (HCC): Status: ACTIVE | Noted: 2020-04-21

## 2020-08-17 PROCEDURE — 99443 PR PHYS/QHP TELEPHONE EVALUATION 21-30 MIN: CPT | Performed by: FAMILY MEDICINE

## 2020-08-17 RX ORDER — LIDOCAINE 50 MG/G
1 PATCH TOPICAL DAILY
COMMUNITY
Start: 2020-07-28 | End: 2021-02-01

## 2020-08-17 NOTE — PROGRESS NOTES
"Subjective   Charline Cook is a 44 y.o. female.     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   Following with the same infectious disease doctor.   This time presented with RUE DVT. She was started on eliquis.   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 Going to set up on copper infusions.   She is currently eating by mouth but this way she does not absorb. She does not have J tube feeds going now. J tube was changed in April. Was just clogged in this most recent hospitalization. Feels this is getting back to working well. She has love's.  She has two incisional hernias in her stomach. She is having vomiting. Tube feeds made her sicker. Tried various types of feeds and did not tolerate any of them well. Think the lines are the reason for her repeat infections and her clotting as well. Trying to figure out what to do after this round of antibiotics because pt wants and feels she needs a line but docs are hesitant and want to pull line related to her recurrent infections.     She says she has a lot of PTSD, going to hospital she goes to the ICU a lot. She is getting tired again. Has her down.   Her pain control \"is awesome,\" on fentanyl 50 mcg and 5 mg hydrocodone TID prn, usually uses it about twice per day. Has days where she needs the three times.     New house for awhile now. She has been able to do some walking in the house when first moved in during 5/2020. Walking in house and to the car.   Sometimes up and down the ramp. Doing some PT.   Last time she walked in the house was Mother's day.     Dr. Heath is the hematology.   Neurology for transverse myelitis she has demyelination. She has reduced Ig with [facility].   Started IVIg and has had about 4-5 treatments. Dr. Yobany Cohen.  Doing IVIg weekly. This is newer therapy for her.   Trouble with legs again " and thought was going to be related to her copper being low but on recent admission when checked pt reported they were surprised to see that her copper was elevated.     She needs a PNA shot.  She has had colonoscopy but 6-7 years ago. Normal done with Dr. Roxy Willis.   She has had a hysterectomy for heavy and painful periods.     The following portions of the patient's history were reviewed and updated as appropriate: allergies, current medications, past medical history, past social history, past surgical history and problem list.    Review of Systems   Constitutional: Positive for activity change and appetite change.   Gastrointestinal: Positive for nausea and vomiting.   Neurological: Positive for weakness.   Psychiatric/Behavioral: Positive for sleep disturbance. The patient is nervous/anxious.        Objective   LMP  (LMP Unknown)   Physical Exam  Not available for visit type, pt unable to get video portion enabled.   Assessment/Plan   Charline was seen today for depression.    Diagnoses and all orders for this visit:    Anxiety and depression    she has trouble with all the hospitalizations. Gets so sick in the hospital, hates going but ends up needing too quite frequently.   She is taking meds by mouth and not all of them work as well in this way but she has routinely taken her duloxetine and venlafaxine this way. Her sleep varies a lot. Sometimes she will sleep long period and then have stretches with few hours of sleep. She is not very active because of her immobility though she tries. Needs to start doing PT again now that she knows her copper is in good levels. Going to get copper infusions with neuro.   Related to her clots and immobility she was started on eliquis more recently and determined at this time she needs to stay on AC.   She often gets very depressed related to her lack of pain control but she has really been doing well on her current regimen though not pain free she is consistently much more  comfortable from this standpoint. Now she has more trouble with diet tolerance and fear of hospitalization and the next time she gets sick.     Counseled to get the PPSV23 at the pharmacy soon as she can. She voiced understanding. Have to check timing of vaccine with the IVIg.     Patient gave consent today for a telehealth video visit as following recommendations of our governor and CDC during the COVID-19 pandemic.    22 min was spent in discussion with pt and greater than 50% of that time was spent counseling.

## 2020-08-24 DIAGNOSIS — M79.7 FIBROMYALGIA: ICD-10-CM

## 2020-08-27 RX ORDER — GABAPENTIN 800 MG/1
TABLET ORAL
Qty: 120 TABLET | Refills: 2 | Status: SHIPPED | OUTPATIENT
Start: 2020-08-27 | End: 2020-11-09 | Stop reason: SDUPTHER

## 2020-09-15 RX ORDER — TRAZODONE HYDROCHLORIDE 100 MG/1
TABLET ORAL
Qty: 180 TABLET | Refills: 1 | Status: SHIPPED | OUTPATIENT
Start: 2020-09-15 | End: 2020-09-22 | Stop reason: SDUPTHER

## 2020-09-22 RX ORDER — TRAZODONE HYDROCHLORIDE 100 MG/1
100 TABLET ORAL 2 TIMES DAILY
Qty: 180 TABLET | Refills: 1 | Status: SHIPPED | OUTPATIENT
Start: 2020-09-22 | End: 2021-08-18

## 2020-09-25 RX ORDER — CYCLOBENZAPRINE HCL 10 MG
TABLET ORAL
Qty: 180 TABLET | Refills: 0 | Status: SHIPPED | OUTPATIENT
Start: 2020-09-25 | End: 2020-12-04 | Stop reason: SDUPTHER

## 2020-10-19 DIAGNOSIS — G43.009 MIGRAINE WITHOUT AURA AND WITHOUT STATUS MIGRAINOSUS, NOT INTRACTABLE: ICD-10-CM

## 2020-10-20 RX ORDER — CYANOCOBALAMIN 1000 UG/ML
INJECTION, SOLUTION INTRAMUSCULAR; SUBCUTANEOUS
Qty: 30 ML | Refills: 0 | Status: SHIPPED | OUTPATIENT
Start: 2020-10-20 | End: 2022-01-27

## 2020-10-20 RX ORDER — RIZATRIPTAN BENZOATE 10 MG/1
TABLET, ORALLY DISINTEGRATING ORAL
Qty: 12 TABLET | Refills: 2 | Status: SHIPPED | OUTPATIENT
Start: 2020-10-20 | End: 2021-02-15

## 2020-10-21 DIAGNOSIS — F41.9 ANXIETY: ICD-10-CM

## 2020-10-22 RX ORDER — VENLAFAXINE 75 MG/1
225 TABLET ORAL DAILY
Qty: 90 TABLET | Refills: 0 | Status: SHIPPED | OUTPATIENT
Start: 2020-10-22 | End: 2020-11-26

## 2020-10-22 RX ORDER — DULOXETIN HYDROCHLORIDE 60 MG/1
60 CAPSULE, DELAYED RELEASE ORAL DAILY
Qty: 30 CAPSULE | Refills: 0 | Status: SHIPPED | OUTPATIENT
Start: 2020-10-22 | End: 2020-11-26

## 2020-11-02 DIAGNOSIS — M79.7 FIBROMYALGIA: ICD-10-CM

## 2020-11-02 RX ORDER — GABAPENTIN 800 MG/1
800 TABLET ORAL 4 TIMES DAILY
Qty: 120 TABLET | Refills: 2 | Status: CANCELLED | OUTPATIENT
Start: 2020-11-02

## 2020-11-02 NOTE — TELEPHONE ENCOUNTER
----- Message from Charline Cook sent at 11/2/2020  1:10 PM EST -----  Regarding: Prescription Question  Contact: [phone]  Dr Cooper    I need a gabapentin refill but need for my pharmacy to be switched to [facility] on [address] in [facility]. They said you would have to call the script in since it was a narcotic. Could you please have them switch my pharmacy info. New info    [facility]  [address] [place] [address] [phone]    Thank you!

## 2020-11-03 DIAGNOSIS — M79.7 FIBROMYALGIA: ICD-10-CM

## 2020-11-05 DIAGNOSIS — M79.7 FIBROMYALGIA: ICD-10-CM

## 2020-11-05 RX ORDER — GABAPENTIN 800 MG/1
800 TABLET ORAL 4 TIMES DAILY
Qty: 120 TABLET | Refills: 2 | Status: CANCELLED | OUTPATIENT
Start: 2020-11-05

## 2020-11-05 NOTE — TELEPHONE ENCOUNTER
----- Message from Charline Cook sent at 11/5/2020 11:15 AM EST -----  Regarding: Prescription Question  Contact: [phone]  Dr Cooper     I need a gabapentin refill but need for my pharmacy to be switched to [facility] on [address] in [facility]. They said you would have to call the script in since it was a narcotic. Could you please have them switch my pharmacy info. New info     [facility]   [address] [place] [address] [phone]      Please let me know when you do it. Thanks!

## 2020-11-09 DIAGNOSIS — M79.7 FIBROMYALGIA: ICD-10-CM

## 2020-11-09 RX ORDER — GABAPENTIN 800 MG/1
800 TABLET ORAL 4 TIMES DAILY
Qty: 120 TABLET | Refills: 2 | Status: SHIPPED | OUTPATIENT
Start: 2020-11-09 | End: 2021-03-04

## 2020-11-25 DIAGNOSIS — F41.9 ANXIETY: ICD-10-CM

## 2020-11-26 RX ORDER — DULOXETIN HYDROCHLORIDE 60 MG/1
CAPSULE, DELAYED RELEASE ORAL
Qty: 90 CAPSULE | Refills: 0 | Status: SHIPPED | OUTPATIENT
Start: 2020-11-26 | End: 2021-03-09

## 2020-11-26 RX ORDER — VENLAFAXINE 75 MG/1
TABLET ORAL
Qty: 90 TABLET | Refills: 0 | Status: SHIPPED | OUTPATIENT
Start: 2020-11-26 | End: 2021-01-07

## 2020-12-04 RX ORDER — CYCLOBENZAPRINE HCL 10 MG
10 TABLET ORAL 2 TIMES DAILY PRN
Qty: 180 TABLET | Refills: 0 | Status: SHIPPED | OUTPATIENT
Start: 2020-12-04 | End: 2021-02-24

## 2020-12-21 RX ORDER — NORTRIPTYLINE HYDROCHLORIDE 25 MG/1
25 CAPSULE ORAL NIGHTLY
Qty: 90 CAPSULE | Refills: 1 | Status: SHIPPED | OUTPATIENT
Start: 2020-12-21 | End: 2021-02-24

## 2021-01-07 RX ORDER — VENLAFAXINE 75 MG/1
TABLET ORAL
Qty: 90 TABLET | Refills: 0 | Status: SHIPPED | OUTPATIENT
Start: 2021-01-07 | End: 2021-02-08

## 2021-02-01 ENCOUNTER — OFFICE VISIT (OUTPATIENT)
Dept: FAMILY MEDICINE CLINIC | Facility: CLINIC | Age: 46
End: 2021-02-01

## 2021-02-01 VITALS
TEMPERATURE: 97.1 F | SYSTOLIC BLOOD PRESSURE: 126 MMHG | RESPIRATION RATE: 18 BRPM | HEIGHT: 63 IN | DIASTOLIC BLOOD PRESSURE: 82 MMHG | HEART RATE: 114 BPM | OXYGEN SATURATION: 99 % | BODY MASS INDEX: 24.12 KG/M2

## 2021-02-01 DIAGNOSIS — G43.009 MIGRAINE WITHOUT AURA AND WITHOUT STATUS MIGRAINOSUS, NOT INTRACTABLE: Primary | ICD-10-CM

## 2021-02-01 DIAGNOSIS — R23.2 HOT FLASHES: ICD-10-CM

## 2021-02-01 DIAGNOSIS — R68.89 HEAT INTOLERANCE: ICD-10-CM

## 2021-02-01 DIAGNOSIS — E43 UNSPECIFIED SEVERE PROTEIN-CALORIE MALNUTRITION (HCC): ICD-10-CM

## 2021-02-01 DIAGNOSIS — F33.42 RECURRENT MAJOR DEPRESSIVE DISORDER, IN FULL REMISSION (HCC): ICD-10-CM

## 2021-02-01 DIAGNOSIS — E55.9 VITAMIN D DEFICIENCY: ICD-10-CM

## 2021-02-01 DIAGNOSIS — E46 MALNUTRITION, UNSPECIFIED TYPE (HCC): ICD-10-CM

## 2021-02-01 PROBLEM — I26.99 PULMONARY EMBOLISM (HCC): Status: ACTIVE | Noted: 2020-10-01

## 2021-02-01 PROBLEM — A41.9 LINE SEPSIS (HCC): Status: ACTIVE | Noted: 2020-12-20

## 2021-02-01 PROBLEM — R74.8 ABNORMAL TRANSAMINASES: Status: RESOLVED | Noted: 2019-12-14 | Resolved: 2021-02-01

## 2021-02-01 PROBLEM — A41.01 MSSA (METHICILLIN SUSCEPTIBLE STAPHYLOCOCCUS AUREUS) SEPTICEMIA: Status: ACTIVE | Noted: 2019-12-14

## 2021-02-01 PROBLEM — I80.9 SEPTIC PHLEBITIS: Status: ACTIVE | Noted: 2021-02-01

## 2021-02-01 PROBLEM — T85.79XA LINE SEPSIS: Status: ACTIVE | Noted: 2020-12-20

## 2021-02-01 PROBLEM — R63.4 WEIGHT LOSS: Status: RESOLVED | Noted: 2018-04-17 | Resolved: 2021-02-01

## 2021-02-01 PROCEDURE — 99214 OFFICE O/P EST MOD 30 MIN: CPT | Performed by: FAMILY MEDICINE

## 2021-02-01 RX ORDER — DIPHENHYDRAMINE HYDROCHLORIDE 50 MG/ML
INJECTION INTRAMUSCULAR; INTRAVENOUS
COMMUNITY
Start: 2020-10-20

## 2021-02-01 RX ORDER — DRONABINOL 5 MG/1
CAPSULE ORAL
COMMUNITY
Start: 2021-01-18 | End: 2022-01-27

## 2021-02-01 NOTE — PROGRESS NOTES
"Chief Complaint  Med Management (needs contract )    Subjective          Charline Cook presents to [facility] for   History of Present Illness  Migraine  She still has headaches most of the days. Usually 3-5 times per week. Her headaches can last days.   She is used to having headaches and nausea. She takes the rizatriptan and it can help. Prophylactic medication can be problematic because of her poor absorption. She has to do meds through her tube to a large degree and still has some trouble with absorption.   She has not been on a diet for weeks. She eats by mouth but vomits most of it. She is malnourished and is going to get set up with TPN. She has not tolerated any TFs previously. Has tried a wide range including dairy free and gluten free. They all just make her sick.     Depression  She is actually doing pretty well right now. Her mother comes around and helps a lot more now that they moved to [place]. Says she is mobile again though she does fall down a lot. She is able to walk around their new house holding onto things as she goes. She feels like she has been given extra time to live with all that she has been through and she is looking forward to grandchildren. The venlafaxine helps she feels. She is on a high dose but probably with suboptimal absorption.     Vitamin/nutrition deficiencies.   She is seeing hematology and neuro. She is being supplemented with copper.  This is going pretty well, having a level checked again as it has been months since her last check.     She complains of periodic night sweats and hot flashes. They come and go. Have been with infections before. She has been having them again periodically.   She is also taking vitamin D. Has been low in the past.   Objective   Vital Signs:   /82 (BP Location: Left arm, Patient Position: Sitting, Cuff Size: Adult)   Pulse 114   Temp 97.1 °F (36.2 °C) (Infrared)   Resp 18   Ht 158.8 cm (62.5\")   SpO2 99%   " BMI 24.12 kg/m²     Physical Exam  Vitals signs reviewed.   Constitutional:       General: She is not in acute distress.  Eyes:      General: No scleral icterus.        Right eye: No discharge.         Left eye: No discharge.      Conjunctiva/sclera: Conjunctivae normal.   Cardiovascular:      Rate and Rhythm: Normal rate and regular rhythm.      Heart sounds: Normal heart sounds. No murmur.   Pulmonary:      Effort: Pulmonary effort is normal. No respiratory distress.      Breath sounds: Normal breath sounds. No wheezing.   Abdominal:      General: There is no distension.      Palpations: Abdomen is soft.      Tenderness: There is no abdominal tenderness.   Neurological:      Mental Status: She is oriented to person, place, and time.      Motor: No abnormal muscle tone.      Gait: Gait abnormal.      Comments: She is in her power chair but she stands independently and walks a couple of feet so she can lean on the exam table. She stands there for 10-15 seconds and then gets back in her chair.    Psychiatric:         Mood and Affect: Mood normal.         Behavior: Behavior normal.        Result Review :                 Assessment and Plan    Problem List Items Addressed This Visit        Endocrine and Metabolic    Vitamin D deficiency    Malnutrition (CMS/HCC)    Relevant Orders    Vitamin D 25 Hydroxy (Completed)    Lipid Panel (Completed)       Mental Health    Major depressive disorder       Neuro    Migraine without aura and without status migrainosus, not intractable - Primary      Other Visit Diagnoses     Hot flashes        Relevant Orders    TSH (Completed)    T4 (Completed)    Heat intolerance        Relevant Orders    TSH (Completed)    T4 (Completed)    Unspecified severe protein-calorie malnutrition (CMS/HCC)         Relevant Orders    Vitamin D 25 Hydroxy (Completed)          Follow Up   No follow-ups on file.  Patient was given instructions and counseling regarding her condition or for health  maintenance advice. Please see specific information pulled into the AVS if appropriate.     She is overall doing pretty well right now. Her mood is good and she is mobile out of her wheelchair for the first time in over a year. She is doing much better at the new house. She has been admitted several times over the last year for line infection. She has very limited access options.   She manages her J-tube at home. She does her medications mostly through her tube.   She is hoping TPN will start soon.     She attributes how good she feels to her pain control. She is working with pain management. She is on fentanyl which she never would have imagined taking but she says has helped so much and she feels more comfortable more of the time. Still with breakthrough pain, vomiting.    Continue same meds at this time.   Would like to get her on a better migraine ppx and she would be good candidate for injection medication given her malabsorption.   She has tried other medication for migraines years ago.     Going to check in on vitamin D and will check her thyroid levels given the hot flashes and night sweats.

## 2021-02-02 ENCOUNTER — TELEPHONE (OUTPATIENT)
Dept: FAMILY MEDICINE CLINIC | Facility: CLINIC | Age: 46
End: 2021-02-02

## 2021-02-02 LAB
25(OH)D3+25(OH)D2 SERPL-MCNC: 13.3 NG/ML (ref 30–100)
CHOLEST SERPL-MCNC: 205 MG/DL (ref 0–200)
HDLC SERPL-MCNC: 52 MG/DL (ref 40–60)
LDLC SERPL CALC-MCNC: 99 MG/DL (ref 0–100)
T4 SERPL-MCNC: 5.03 MCG/DL (ref 4.5–11.7)
TRIGL SERPL-MCNC: 323 MG/DL (ref 0–150)
TSH SERPL DL<=0.005 MIU/L-ACNC: 1.86 UIU/ML (ref 0.27–4.2)
VLDLC SERPL CALC-MCNC: 54 MG/DL (ref 5–40)

## 2021-02-02 NOTE — TELEPHONE ENCOUNTER
ADAMA VAUGHAN HAS TRIED TO REACH OUT TO THIS PATIENT AND THERE HAS BEEN NO RESPONSE. IF ANY MEDICAL MANAGEMENT ISSUES ARE NEEDED, PLEASE GIVE ADAMA A CALL SO SHE CAN HELP?  IF NOT SHE WILL JUST CLOSE THE CASE.    PLEASE ADVISE [phone] EXT [phone]

## 2021-02-08 RX ORDER — VENLAFAXINE 75 MG/1
TABLET ORAL
Qty: 270 TABLET | Refills: 0 | Status: SHIPPED | OUTPATIENT
Start: 2021-02-08 | End: 2021-05-26

## 2021-02-09 RX ORDER — ERGOCALCIFEROL 1.25 MG/1
50000 CAPSULE ORAL WEEKLY
Qty: 12 CAPSULE | Refills: 1 | Status: SHIPPED | OUTPATIENT
Start: 2021-02-09 | End: 2021-08-18

## 2021-02-12 DIAGNOSIS — G43.009 MIGRAINE WITHOUT AURA AND WITHOUT STATUS MIGRAINOSUS, NOT INTRACTABLE: ICD-10-CM

## 2021-02-15 RX ORDER — RIZATRIPTAN BENZOATE 10 MG/1
TABLET, ORALLY DISINTEGRATING ORAL
Qty: 12 TABLET | Refills: 3 | Status: SHIPPED | OUTPATIENT
Start: 2021-02-15 | End: 2022-01-05

## 2021-02-24 RX ORDER — NORTRIPTYLINE HYDROCHLORIDE 25 MG/1
25 CAPSULE ORAL NIGHTLY
Qty: 90 CAPSULE | Refills: 1 | Status: SHIPPED | OUTPATIENT
Start: 2021-02-24 | End: 2022-01-27

## 2021-02-24 RX ORDER — CYCLOBENZAPRINE HCL 10 MG
TABLET ORAL
Qty: 180 TABLET | Refills: 0 | Status: SHIPPED | OUTPATIENT
Start: 2021-02-24 | End: 2021-07-07

## 2021-02-25 ENCOUNTER — TELEPHONE (OUTPATIENT)
Dept: FAMILY MEDICINE CLINIC | Facility: CLINIC | Age: 46
End: 2021-02-25

## 2021-02-25 NOTE — TELEPHONE ENCOUNTER
Caller: Clotilde    Relationship to patient: [employer]    Best call back number: [phone]    Concerns or Questions if Applicable: Clotilde with [employer] is calling to state the appeal for [employer] was sent to her in error.  She states MA will need to look at the denial for the best fax number and then send the appeal to that fax number.  It needs [facility] go to [facility].    Reference key:  [id number]      Please advise.

## 2021-03-02 DIAGNOSIS — M79.7 FIBROMYALGIA: ICD-10-CM

## 2021-03-02 NOTE — TELEPHONE ENCOUNTER
Verified with [employer] that the fax number was correct. Not sure how this got back to cover my meds.

## 2021-03-04 RX ORDER — GABAPENTIN 800 MG/1
TABLET ORAL
Qty: 120 TABLET | Refills: 0 | Status: SHIPPED | OUTPATIENT
Start: 2021-03-04 | End: 2021-04-20

## 2021-03-08 DIAGNOSIS — F41.9 ANXIETY: ICD-10-CM

## 2021-03-09 RX ORDER — DULOXETIN HYDROCHLORIDE 60 MG/1
CAPSULE, DELAYED RELEASE ORAL
Qty: 90 CAPSULE | Refills: 1 | Status: SHIPPED | OUTPATIENT
Start: 2021-03-09 | End: 2021-09-10 | Stop reason: ALTCHOICE

## 2021-04-19 DIAGNOSIS — M79.7 FIBROMYALGIA: ICD-10-CM

## 2021-04-20 RX ORDER — GABAPENTIN 800 MG/1
TABLET ORAL
Qty: 120 TABLET | Refills: 0 | Status: SHIPPED | OUTPATIENT
Start: 2021-04-20 | End: 2021-05-26

## 2021-05-24 DIAGNOSIS — M79.7 FIBROMYALGIA: ICD-10-CM

## 2021-05-26 RX ORDER — GABAPENTIN 800 MG/1
TABLET ORAL
Qty: 120 TABLET | Refills: 0 | Status: SHIPPED | OUTPATIENT
Start: 2021-05-26 | End: 2021-07-28

## 2021-05-26 RX ORDER — VENLAFAXINE 75 MG/1
TABLET ORAL
Qty: 270 TABLET | Refills: 0 | Status: SHIPPED | OUTPATIENT
Start: 2021-05-26 | End: 2021-08-27 | Stop reason: SDUPTHER

## 2021-06-07 ENCOUNTER — TELEPHONE (OUTPATIENT)
Dept: FAMILY MEDICINE CLINIC | Facility: CLINIC | Age: 46
End: 2021-06-07

## 2021-06-07 NOTE — TELEPHONE ENCOUNTER
Caller: BRITT    CALLED BACK STATING THAT RECORDS DEPARTMENT HAS ADVISED HER TO SPEAK CLINICAL STAFF AT OFFICE AS IF SHE WAS TO SPEAK WITH RECORDS DEPARTMENT, THIS WOULD TAKE 1 MONTH OR LONGER. HOME HEALTH PROVIDER REQUESTING A CALL BACK TO DISCUSS NOTES    [phone]

## 2021-06-07 NOTE — TELEPHONE ENCOUNTER
POONAM FROM [facility] IS CALLING IN REQUESTING LAS VISIT NOTES AND MEDICATION LIST FOR PATIENT.      PLEASE ADVISE    CALLBACK NUMBER IS  [phone]

## 2021-06-07 NOTE — TELEPHONE ENCOUNTER
Spoke with kay and she was advised to call our office back and tell her this is for continued care and see if we can release patient records by our records dept. I advised her that since ever did not write this order and we have not seen this patient since feb 2021 that she will need to go through medical records. Voiced understanding.

## 2021-07-07 RX ORDER — CYCLOBENZAPRINE HCL 10 MG
TABLET ORAL
Qty: 180 TABLET | Refills: 0 | Status: SHIPPED | OUTPATIENT
Start: 2021-07-07 | End: 2021-09-24

## 2021-07-27 DIAGNOSIS — M79.7 FIBROMYALGIA: ICD-10-CM

## 2021-07-28 RX ORDER — GABAPENTIN 800 MG/1
TABLET ORAL
Qty: 120 TABLET | Refills: 0 | Status: SHIPPED | OUTPATIENT
Start: 2021-07-28 | End: 2021-08-27 | Stop reason: SDUPTHER

## 2021-08-02 PROBLEM — M48.50XA SPINAL COMPRESSION FRACTURE (HCC): Status: ACTIVE | Noted: 2021-03-11

## 2021-08-02 PROBLEM — M35.00 SJOGREN'S SYNDROME (HCC): Status: ACTIVE | Noted: 2021-08-02

## 2021-08-02 PROBLEM — M54.9 BACK PAIN: Status: ACTIVE | Noted: 2021-04-04

## 2021-08-02 PROBLEM — D50.8 IRON DEFICIENCY ANEMIA DUE TO DIETARY CAUSES: Status: ACTIVE | Noted: 2021-06-22

## 2021-08-02 PROBLEM — D52.0 DIETARY FOLATE DEFICIENCY ANEMIA: Status: ACTIVE | Noted: 2021-01-17

## 2021-08-02 PROBLEM — R50.9 FEVER: Status: ACTIVE | Noted: 2021-03-11

## 2021-08-16 NOTE — TELEPHONE ENCOUNTER
Rx Refill Note  Requested Prescriptions     Pending Prescriptions Disp Refills   • traZODone (DESYREL) 100 MG tablet [Pharmacy Med Name: traZODone HCl Oral Tablet 100 MG] 180 tablet 0     Sig: TAKE 1 TABLET BY MOUTH TWO TIMES A DAY   • vitamin D (ERGOCALCIFEROL) 1.25 MG (23222 UT) capsule capsule [Pharmacy Med Name: Vitamin D (Ergocalciferol) Oral Capsule 1.25 MG (75222 UT)] 12 capsule 0     Sig: TAKE 1 CAPSULE BY MOUTH ONE TIME A WEEK WITH FOOD      Last office visit with prescribing clinician: 2/1/2021      Next office visit with prescribing clinician: Visit date not found            Alessnadra Dunbar LPN  08/16/21, 18:02 EDT

## 2021-08-18 RX ORDER — TRAZODONE HYDROCHLORIDE 100 MG/1
TABLET ORAL
Qty: 60 TABLET | Refills: 0 | Status: SHIPPED | OUTPATIENT
Start: 2021-08-18 | End: 2021-10-08

## 2021-08-18 RX ORDER — ERGOCALCIFEROL 1.25 MG/1
CAPSULE ORAL
Qty: 4 CAPSULE | Refills: 0 | Status: SHIPPED | OUTPATIENT
Start: 2021-08-18 | End: 2021-10-08

## 2021-08-18 NOTE — TELEPHONE ENCOUNTER
She was due for visit and she missed. Need some kind of visit because I write for her gabapentin controlled substance prescription so need to check in on her.

## 2021-08-27 ENCOUNTER — TELEMEDICINE (OUTPATIENT)
Dept: FAMILY MEDICINE CLINIC | Facility: CLINIC | Age: 46
End: 2021-08-27
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
## 2021-08-27 DIAGNOSIS — F41.9 ANXIETY AND DEPRESSION: Primary | ICD-10-CM

## 2021-08-27 DIAGNOSIS — F32.A ANXIETY AND DEPRESSION: Primary | ICD-10-CM

## 2021-08-27 DIAGNOSIS — R42 VERTIGO: ICD-10-CM

## 2021-08-27 DIAGNOSIS — M79.7 FIBROMYALGIA: ICD-10-CM

## 2021-08-27 PROCEDURE — 99442 PR PHYS/QHP TELEPHONE EVALUATION 11-20 MIN: CPT | Performed by: FAMILY MEDICINE

## 2021-08-27 RX ORDER — VENLAFAXINE 75 MG/1
225 TABLET ORAL DAILY
Qty: 270 TABLET | Refills: 2 | Status: SHIPPED | OUTPATIENT
Start: 2021-08-27 | End: 2022-06-02

## 2021-08-27 RX ORDER — HYDROMORPHONE HYDROCHLORIDE 10 MG/ML
3-6 INJECTION INTRAMUSCULAR; INTRAVENOUS; SUBCUTANEOUS
COMMUNITY
End: 2023-01-17

## 2021-08-27 RX ORDER — GABAPENTIN 800 MG/1
800 TABLET ORAL 4 TIMES DAILY
Qty: 120 TABLET | Refills: 0 | Status: SHIPPED | OUTPATIENT
Start: 2021-08-27 | End: 2021-12-03

## 2021-09-09 ENCOUNTER — TELEPHONE (OUTPATIENT)
Dept: FAMILY MEDICINE CLINIC | Facility: CLINIC | Age: 46
End: 2021-09-09

## 2021-09-09 NOTE — TELEPHONE ENCOUNTER
PATIENT'S  CALLED  TO RESCHEDULE APPOINTMENT BUT THE 3:30 AND 4:00 WERE ALREADY FILLED. SHE WILL BE IN AT 12:00 TOMORROW WITH MACRINA LY    CALL BACK NUMBER [phone]      TOBY    CALLED THE  AND WAS ADVISED OF APPOINTMENTS TIMES WERE FILLED WITH DR. DOOLEY

## 2021-09-10 ENCOUNTER — OFFICE VISIT (OUTPATIENT)
Dept: FAMILY MEDICINE CLINIC | Facility: CLINIC | Age: 46
End: 2021-09-10

## 2021-09-10 VITALS
SYSTOLIC BLOOD PRESSURE: 123 MMHG | HEART RATE: 118 BPM | TEMPERATURE: 97.5 F | OXYGEN SATURATION: 96 % | DIASTOLIC BLOOD PRESSURE: 79 MMHG

## 2021-09-10 DIAGNOSIS — H61.22 IMPACTED CERUMEN OF LEFT EAR: ICD-10-CM

## 2021-09-10 DIAGNOSIS — B02.9 HERPES ZOSTER WITHOUT COMPLICATION: Primary | ICD-10-CM

## 2021-09-10 PROCEDURE — 69209 REMOVE IMPACTED EAR WAX UNI: CPT | Performed by: NURSE PRACTITIONER

## 2021-09-10 PROCEDURE — 99213 OFFICE O/P EST LOW 20 MIN: CPT | Performed by: NURSE PRACTITIONER

## 2021-09-10 RX ORDER — DOCUSATE SODIUM 50 MG/5ML
LIQUID ORAL
COMMUNITY
Start: 2021-09-01 | End: 2022-01-27 | Stop reason: SDUPTHER

## 2021-09-10 RX ORDER — DULOXETIN HYDROCHLORIDE 30 MG/1
30 CAPSULE, DELAYED RELEASE ORAL DAILY
COMMUNITY
Start: 2021-08-26 | End: 2022-01-13 | Stop reason: SDUPTHER

## 2021-09-10 RX ORDER — LORAZEPAM 2 MG/ML
CONCENTRATE ORAL
COMMUNITY
Start: 2021-08-11 | End: 2022-07-26

## 2021-09-10 RX ORDER — VALACYCLOVIR HYDROCHLORIDE 1 G/1
1000 TABLET, FILM COATED ORAL 3 TIMES DAILY
Qty: 21 TABLET | Refills: 0 | Status: SHIPPED | OUTPATIENT
Start: 2021-09-10 | End: 2021-09-17

## 2021-09-10 RX ORDER — CIPROFLOXACIN AND DEXAMETHASONE 3; 1 MG/ML; MG/ML
SUSPENSION/ DROPS AURICULAR (OTIC)
COMMUNITY
Start: 2021-09-03 | End: 2022-01-27

## 2021-09-10 RX ORDER — DOCUSATE SODIUM 50 MG/5ML
LIQUID ORAL
COMMUNITY
Start: 2021-09-02 | End: 2023-01-17

## 2021-09-10 RX ORDER — SENNOSIDES 8.8 MG/5ML
LIQUID ORAL
COMMUNITY
Start: 2021-09-04

## 2021-09-10 RX ORDER — METHADONE HYDROCHLORIDE 10 MG/5ML
50 SOLUTION ORAL 2 TIMES DAILY
COMMUNITY
Start: 2021-09-03

## 2021-09-10 RX ORDER — SODIUM CHLORIDE 0.9 % (FLUSH) 0.9 %
SYRINGE (ML) INJECTION
COMMUNITY
Start: 2021-08-31

## 2021-09-10 RX ORDER — VENLAFAXINE 75 MG/1
TABLET ORAL
COMMUNITY
Start: 2021-07-27 | End: 2021-09-10 | Stop reason: ALTCHOICE

## 2021-09-10 RX ORDER — HEPARIN SODIUM (PORCINE) LOCK FLUSH IV SOLN 100 UNIT/ML 100 UNIT/ML
SOLUTION INTRAVENOUS
COMMUNITY
Start: 2021-08-17 | End: 2022-01-27

## 2021-09-10 NOTE — PROGRESS NOTES
Chief Complaint  Ear Problem (c/o loss of hearing in R ear, pain in R ear, blisters in R ear x3days)    Subjective     {Problem List  Visit Diagnosis   Encounters  Notes  Medications  Labs  Result Review Imaging  Media :23}     Charline Cook presents to [facility]  History of Present Illness    Objective   Vital Signs:   /79   Pulse 118   Temp 97.5 °F (36.4 °C)   SpO2 96%     Physical Exam   Result Review :{Labs  Result Review  Imaging  Med Tab  Media  Procedures :23}   {The following data was reviewed by (Optional):48648}  {Ambulatory Labs (Optional):06939}  {Data reviewed (Optional):87804:::1}          Assessment and Plan {CC Problem List  Visit Diagnosis   ROS  Review (Popup)  Health Maintenance  Quality  BestPractice  Medications  SmartSets  SnapShot Encounters  Media :23}   Diagnoses and all orders for this visit:    1. Herpes zoster without complication (Primary)  -     valACYclovir (Valtrex) 1000 MG tablet; Take 1 tablet by mouth 3 (Three) Times a Day for 7 days. Crush per J-tube  Dispense: 21 tablet; Refill: 0      {Time Spent (Optional):15553}  Follow Up {Instructions Charge Capture  Follow-up Communications :23}  No follow-ups on file.  Patient was given instructions and counseling regarding her condition or for health maintenance advice. Please see specific information pulled into the AVS if appropriate.

## 2021-09-10 NOTE — PROGRESS NOTES
Chief Complaint  Ear Problem (c/o loss of hearing in R ear, pain in R ear, blisters in R ear x3days)    Subjective          Charline Cook presents to [facility]  History of Present Illness new pt to me.  Here with mom for sudden loss of hearing in right ear that began 3 days ago when she woke up.  Her family has been trying to get wax out of her ear until then but not quite successful.She was given cipro gtts and debrox which she has been using without improvement.   Ear is somewhat painful and radiates down into her right neck.  Spouse noted some blisters in ear and wonders if she has shingles.  She does admit that she had some pain from right shoulder area that radiated down her right arm appr time of onset, but now resolved.      Denies fever, chills, congestion, sore throat, HA, abd pain, n/v/d.  Does admit mild prodrome sx at onset with Tmax of 99.0.       Objective   Vital Signs:   /79   Pulse 118   Temp 97.5 °F (36.4 °C)   SpO2 96%     Physical Exam  Vitals and nursing note reviewed.   Constitutional:       General: She is not in acute distress.     Appearance: She is well-developed. She is not ill-appearing or diaphoretic.   HENT:      Head: Normocephalic and atraumatic.      Comments: Right Tm initially with cerumen impaction, after disimpaction canal is clear and pt has return of hearing.  Mildly erythematous canal with vesicular lesion at just superior to ear lobe with some mild erythema and papules noted to right outer ear-no lesions noted in canal.  Ear not particularly TTP     Left Ear: Tympanic membrane, ear canal and external ear normal.      Mouth/Throat:      Mouth: Mucous membranes are moist.      Pharynx: No posterior oropharyngeal erythema.   Eyes:      General:         Right eye: No discharge.         Left eye: No discharge.      Conjunctiva/sclera: Conjunctivae normal.   Cardiovascular:      Rate and Rhythm: Normal rate and regular rhythm.      Heart  sounds: Normal heart sounds.   Pulmonary:      Effort: Pulmonary effort is normal.      Breath sounds: Normal breath sounds.   Abdominal:      General: Bowel sounds are normal.      Palpations: Abdomen is soft.      Tenderness: There is no abdominal tenderness.   Musculoskeletal:         General: No deformity.      Cervical back: Neck supple.      Comments: w/c   Lymphadenopathy:      Cervical: No cervical adenopathy.   Skin:     General: Skin is warm and dry.      Comments: Port right chest with DSD intact   Neurological:      Mental Status: She is alert and oriented to person, place, and time.   Psychiatric:         Mood and Affect: Mood normal.         Behavior: Behavior normal.        Result Review :                 Assessment and Plan    Diagnoses and all orders for this visit:    1. Herpes zoster without complication (Primary)  -     valACYclovir (Valtrex) 1000 MG tablet; Take 1 tablet by mouth 3 (Three) Times a Day for 7 days. Crush per J-tube  Dispense: 21 tablet; Refill: 0    2. Impacted cerumen of left ear      Pt tolerated right ear disimpaction well with restoration of hearing.  Will treat for presumptive shingles right ear.  She is aware of s/s complications that require f/u eval.           Follow Up   Return if symptoms worsen or fail to improve.  Patient was given instructions and counseling regarding her condition or for health maintenance advice. Please see specific information pulled into the AVS if appropriate.

## 2021-09-24 RX ORDER — CYCLOBENZAPRINE HCL 10 MG
TABLET ORAL
Qty: 180 TABLET | Refills: 0 | Status: SHIPPED | OUTPATIENT
Start: 2021-09-24 | End: 2022-01-27

## 2021-09-24 NOTE — TELEPHONE ENCOUNTER
Rx Refill Note  Requested Prescriptions     Pending Prescriptions Disp Refills   • cyclobenzaprine (FLEXERIL) 10 MG tablet [Pharmacy Med Name: Cyclobenzaprine HCl Oral Tablet 10 MG] 180 tablet 0     Sig: TAKE 1 TABLET BY MOUTH TWO TIMES A DAY AS NEEDED FOR MUSCLE SPASM      Last office visit with prescribing clinician: 9/21      Next office visit with prescribing clinician: 3/22            Alessandra Dunbar LPN  09/24/21, 09:02 EDT

## 2021-10-08 RX ORDER — ERGOCALCIFEROL 1.25 MG/1
CAPSULE ORAL
Qty: 12 CAPSULE | Refills: 1 | Status: SHIPPED | OUTPATIENT
Start: 2021-10-08

## 2021-10-08 RX ORDER — TRAZODONE HYDROCHLORIDE 100 MG/1
TABLET ORAL
Qty: 180 TABLET | Refills: 1 | Status: SHIPPED | OUTPATIENT
Start: 2021-10-08 | End: 2022-04-25

## 2021-10-08 NOTE — TELEPHONE ENCOUNTER
Rx Refill Note  Requested Prescriptions     Pending Prescriptions Disp Refills   • vitamin D (ERGOCALCIFEROL) 1.25 MG (95806 UT) capsule capsule [Pharmacy Med Name: Vitamin D (Ergocalciferol) Oral Capsule 1.25 MG (41789 UT)] 4 capsule 0     Sig: TAKE 1 CAPSULE BY MOUTH ONE TIME A WEEK WITH FOOD   • traZODone (DESYREL) 100 MG tablet [Pharmacy Med Name: traZODone HCl Oral Tablet 100 MG] 60 tablet 0     Sig: TAKE 1 TABLET BY MOUTH TWO TIMES A DAY      Last office visit with prescribing clinician: 2/1/2021      Next office visit with prescribing clinician: 3/1/2022            Alessandra Dunbar LPN  10/08/21, 07:07 EDT

## 2021-10-20 ENCOUNTER — TELEPHONE (OUTPATIENT)
Dept: FAMILY MEDICINE CLINIC | Facility: CLINIC | Age: 46
End: 2021-10-20

## 2021-10-20 NOTE — TELEPHONE ENCOUNTER
Caller: MANI     Relationship: [employer]   ROZINA    Best call back number: [phone]    What is the best time to reach you: ANYTIME     Who are you requesting to speak with (clinical staff, provider,  specific staff member): MA OR DOCTOR     What was the call regarding:  STATES SHE NEEDS TO SPEAK WITH SOMEONE REGARDING A RECENT PROCEDURE    Do you require a callback: YES

## 2021-10-22 NOTE — TELEPHONE ENCOUNTER
Spoke with HHN just needed to verify she was seen for a port infection at [facility].

## 2021-10-25 ENCOUNTER — TELEPHONE (OUTPATIENT)
Dept: CASE MANAGEMENT | Facility: OTHER | Age: 46
End: 2021-10-25

## 2021-10-25 NOTE — TELEPHONE ENCOUNTER
Left message for patient to return Ambulatory Care Manager's call to schedule AWV. Phone number provided [phone].

## 2021-12-02 DIAGNOSIS — M79.7 FIBROMYALGIA: ICD-10-CM

## 2021-12-02 NOTE — TELEPHONE ENCOUNTER
Rx Refill Note  Requested Prescriptions     Pending Prescriptions Disp Refills   • gabapentin (NEURONTIN) 800 MG tablet [Pharmacy Med Name: Gabapentin Oral Tablet 800 MG] 120 tablet 0     Sig: TAKE 1 TABLET BY MOUTH FOUR TIMES A DAY      Last office visit with prescribing clinician: 2/1/2021      Next office visit with prescribing clinician: 3/1/2022            Alessandra Dunbar LPN  12/02/21, 17:07 EST

## 2021-12-03 RX ORDER — GABAPENTIN 800 MG/1
TABLET ORAL
Qty: 120 TABLET | Refills: 0 | Status: SHIPPED | OUTPATIENT
Start: 2021-12-03 | End: 2022-01-24

## 2021-12-10 ENCOUNTER — TELEPHONE (OUTPATIENT)
Dept: FAMILY MEDICINE CLINIC | Facility: CLINIC | Age: 46
End: 2021-12-10

## 2021-12-14 DIAGNOSIS — G63 COPPER DEFICIENCY MYELONEUROPATHY (HCC): ICD-10-CM

## 2021-12-14 DIAGNOSIS — G99.2 COPPER DEFICIENCY MYELONEUROPATHY (HCC): ICD-10-CM

## 2021-12-14 DIAGNOSIS — G82.20 PARAPLEGIA (HCC): Primary | ICD-10-CM

## 2021-12-14 DIAGNOSIS — E61.0 COPPER DEFICIENCY MYELONEUROPATHY (HCC): ICD-10-CM

## 2022-01-02 DIAGNOSIS — G43.009 MIGRAINE WITHOUT AURA AND WITHOUT STATUS MIGRAINOSUS, NOT INTRACTABLE: ICD-10-CM

## 2022-01-04 NOTE — TELEPHONE ENCOUNTER
Rx Refill Note  Requested Prescriptions     Pending Prescriptions Disp Refills   • rizatriptan MLT (MAXALT-MLT) 10 MG disintegrating tablet [Pharmacy Med Name: Rizatriptan Benzoate Oral Tablet Disintegrating 10 MG] 12 tablet 0     Sig: TAKE 1 TABLET BY MOUTH AS NEEDED FOR MIGRAINE. MAY REPEAT ONCE IN 2 HOURS. MAX 2 TABS PER 24 HOURS.      Last office visit with prescribing clinician: 2/1/2021      Next office visit with prescribing clinician: 1/18/2022            Luz Maria Maloney MA  01/04/22, 14:55 EST

## 2022-01-05 DIAGNOSIS — F41.9 ANXIETY: ICD-10-CM

## 2022-01-05 RX ORDER — RIZATRIPTAN BENZOATE 10 MG/1
TABLET, ORALLY DISINTEGRATING ORAL
Qty: 12 TABLET | Refills: 0 | Status: SHIPPED | OUTPATIENT
Start: 2022-01-05 | End: 2022-03-05

## 2022-01-06 RX ORDER — DULOXETIN HYDROCHLORIDE 60 MG/1
CAPSULE, DELAYED RELEASE ORAL
Qty: 90 CAPSULE | Refills: 0 | OUTPATIENT
Start: 2022-01-06

## 2022-01-06 NOTE — TELEPHONE ENCOUNTER
Rx Refill Note  Requested Prescriptions     Pending Prescriptions Disp Refills   • DULoxetine (CYMBALTA) 60 MG capsule [Pharmacy Med Name: DULoxetine HCl Oral Capsule Delayed Release Particles 60 MG] 90 capsule 0     Sig: TAKE 1 CAPSULE BY MOUTH EVERY DAY      Last office visit with prescribing clinician: 2/1/2021      Next office visit with prescribing clinician: 1/18/2022            Joyce Mendoza MA  01/06/22, 12:24 EST

## 2022-01-13 RX ORDER — DULOXETIN HYDROCHLORIDE 60 MG/1
60 CAPSULE, DELAYED RELEASE ORAL DAILY
Qty: 90 CAPSULE | Refills: 1 | Status: SHIPPED | OUTPATIENT
Start: 2022-01-13 | End: 2022-08-16

## 2022-01-18 ENCOUNTER — TELEPHONE (OUTPATIENT)
Dept: FAMILY MEDICINE CLINIC | Facility: CLINIC | Age: 47
End: 2022-01-18

## 2022-01-18 PROBLEM — E83.42 HYPOMAGNESEMIA: Status: ACTIVE | Noted: 2021-10-29

## 2022-01-18 PROBLEM — K80.50 COMMON BILE DUCT STONE: Status: ACTIVE | Noted: 2021-11-23

## 2022-01-18 PROBLEM — R62.7 FAILURE TO THRIVE IN ADULT: Status: ACTIVE | Noted: 2021-10-29

## 2022-01-18 PROBLEM — I82.622 ACUTE DEEP VEIN THROMBOSIS (DVT) OF BRACHIAL VEIN OF LEFT UPPER EXTREMITY: Status: ACTIVE | Noted: 2021-11-05

## 2022-01-18 PROBLEM — T80.219A INFECTED VENOUS ACCESS PORT: Status: ACTIVE | Noted: 2019-09-02

## 2022-01-18 PROBLEM — R65.10 SIRS (SYSTEMIC INFLAMMATORY RESPONSE SYNDROME): Status: ACTIVE | Noted: 2021-10-29

## 2022-01-18 NOTE — TELEPHONE ENCOUNTER
Caller: Oz Mallory    Relationship to patient: Emergency Contact    Best call back number: [phone] (H)    Chief complaint: 3 months med mgmt f/u- R/S FROM 01/18/2022    Type of visit: OFFICE VISIT    Requested date: ASAP     If rescheduling, when is the original appointment: 02/22/2022    Additional notes:PATIENT WAS UNABLE TO MAKE APPOINTMENT ON 01/18/2022 DUE TO VOMITING AND ILLNESS AND NEEDS TO BE SEEN ASAP, PLEASE ADVISE WHEN PATIENT CAN BE WORKED INTO THE SCHEDULE ASAP

## 2022-01-23 DIAGNOSIS — M79.7 FIBROMYALGIA: ICD-10-CM

## 2022-01-24 RX ORDER — GABAPENTIN 800 MG/1
TABLET ORAL
Qty: 120 TABLET | Refills: 0 | Status: SHIPPED | OUTPATIENT
Start: 2022-01-24 | End: 2022-03-05

## 2022-01-27 ENCOUNTER — OFFICE VISIT (OUTPATIENT)
Dept: FAMILY MEDICINE CLINIC | Facility: CLINIC | Age: 47
End: 2022-01-27

## 2022-01-27 VITALS
RESPIRATION RATE: 19 BRPM | TEMPERATURE: 98.6 F | OXYGEN SATURATION: 96 % | SYSTOLIC BLOOD PRESSURE: 126 MMHG | DIASTOLIC BLOOD PRESSURE: 74 MMHG | HEART RATE: 102 BPM

## 2022-01-27 DIAGNOSIS — J30.89 NON-SEASONAL ALLERGIC RHINITIS, UNSPECIFIED TRIGGER: ICD-10-CM

## 2022-01-27 DIAGNOSIS — R20.0 NUMBNESS AND TINGLING: ICD-10-CM

## 2022-01-27 DIAGNOSIS — F41.9 ANXIETY AND DEPRESSION: Primary | ICD-10-CM

## 2022-01-27 DIAGNOSIS — J45.909 REACTIVE AIRWAY DISEASE WITHOUT COMPLICATION, UNSPECIFIED ASTHMA SEVERITY, UNSPECIFIED WHETHER PERSISTENT: ICD-10-CM

## 2022-01-27 DIAGNOSIS — F32.A ANXIETY AND DEPRESSION: Primary | ICD-10-CM

## 2022-01-27 DIAGNOSIS — R20.2 NUMBNESS AND TINGLING: ICD-10-CM

## 2022-01-27 PROCEDURE — 99213 OFFICE O/P EST LOW 20 MIN: CPT | Performed by: FAMILY MEDICINE

## 2022-01-27 RX ORDER — CYCLOBENZAPRINE HCL 10 MG
10 TABLET ORAL 2 TIMES DAILY PRN
Qty: 180 TABLET | Refills: 0 | Status: SHIPPED | OUTPATIENT
Start: 2022-01-27 | End: 2022-03-05

## 2022-01-27 RX ORDER — ALBUTEROL SULFATE 90 UG/1
2 AEROSOL, METERED RESPIRATORY (INHALATION) EVERY 4 HOURS PRN
Qty: 18 G | Refills: 2 | Status: SHIPPED | OUTPATIENT
Start: 2022-01-27

## 2022-01-27 RX ORDER — BUDESONIDE AND FORMOTEROL FUMARATE DIHYDRATE 160; 4.5 UG/1; UG/1
2 AEROSOL RESPIRATORY (INHALATION)
Qty: 10.2 G | Refills: 2 | Status: SHIPPED | OUTPATIENT
Start: 2022-01-27

## 2022-01-27 RX ORDER — POTASSIUM CHLORIDE 20MEQ/15ML
30 LIQUID (ML) ORAL DAILY
COMMUNITY
Start: 2021-11-30 | End: 2023-01-17

## 2022-01-27 RX ORDER — HYDROMORPHONE HYDROCHLORIDE 8 MG/1
1 TABLET, EXTENDED RELEASE ORAL 3 TIMES DAILY
COMMUNITY
End: 2022-01-27 | Stop reason: SDUPTHER

## 2022-01-27 RX ORDER — HYDROMORPHONE HYDROCHLORIDE 8 MG/1
TABLET ORAL
COMMUNITY
Start: 2022-01-13

## 2022-02-23 PROBLEM — T82.594A: Status: RESOLVED | Noted: 2020-07-21 | Resolved: 2022-02-23

## 2022-02-23 PROBLEM — A41.9 LINE SEPSIS: Status: RESOLVED | Noted: 2020-12-20 | Resolved: 2022-02-23

## 2022-02-23 PROBLEM — A41.9 SEPSIS (HCC): Status: RESOLVED | Noted: 2020-04-21 | Resolved: 2022-02-23

## 2022-02-23 PROBLEM — T80.219A INFECTED VENOUS ACCESS PORT: Status: RESOLVED | Noted: 2019-09-02 | Resolved: 2022-02-23

## 2022-02-23 PROBLEM — T85.79XA LINE SEPSIS (HCC): Status: RESOLVED | Noted: 2020-12-20 | Resolved: 2022-02-23

## 2022-03-03 DIAGNOSIS — M79.7 FIBROMYALGIA: ICD-10-CM

## 2022-03-03 DIAGNOSIS — G43.009 MIGRAINE WITHOUT AURA AND WITHOUT STATUS MIGRAINOSUS, NOT INTRACTABLE: ICD-10-CM

## 2022-03-04 NOTE — TELEPHONE ENCOUNTER
Rx Refill Note  Requested Prescriptions     Pending Prescriptions Disp Refills   • cyclobenzaprine (FLEXERIL) 10 MG tablet [Pharmacy Med Name: Cyclobenzaprine HCl Oral Tablet 10 MG] 180 tablet 0     Sig: TAKE 1 TABLET BY MOUTH TWO TIMES A DAY AS NEEDED FOR MUSCLE SPASMS   • gabapentin (NEURONTIN) 800 MG tablet [Pharmacy Med Name: Gabapentin Oral Tablet 800 MG] 120 tablet 0     Sig: TAKE 1 TABLET BY MOUTH FOUR TIMES A DAY   • rizatriptan MLT (MAXALT-MLT) 10 MG disintegrating tablet [Pharmacy Med Name: Rizatriptan Benzoate Oral Tablet Disintegrating 10 MG] 12 tablet 0     Sig: DISSOLVE 1 TABLET BY MOUTH AS NEEDED FOR MIGRAINE. MAY REPEAT ONCE IN 2 HOURS. MAX OF 2 TABS PER 24 HOURS      Last office visit with prescribing clinician: 1/27/2022      Next office visit with prescribing clinician: Visit date not found            Alessandra Dunbar LPN  03/04/22, 08:58 EST

## 2022-03-05 RX ORDER — CYCLOBENZAPRINE HCL 10 MG
TABLET ORAL
Qty: 180 TABLET | Refills: 1 | Status: SHIPPED | OUTPATIENT
Start: 2022-03-05 | End: 2023-03-13

## 2022-03-05 RX ORDER — GABAPENTIN 800 MG/1
TABLET ORAL
Qty: 120 TABLET | Refills: 1 | Status: SHIPPED | OUTPATIENT
Start: 2022-03-05 | End: 2022-06-02

## 2022-03-05 RX ORDER — RIZATRIPTAN BENZOATE 10 MG/1
TABLET, ORALLY DISINTEGRATING ORAL
Qty: 12 TABLET | Refills: 5 | Status: SHIPPED | OUTPATIENT
Start: 2022-03-05

## 2022-04-25 RX ORDER — TRAZODONE HYDROCHLORIDE 100 MG/1
100 TABLET ORAL NIGHTLY
Qty: 90 TABLET | Refills: 1 | Status: SHIPPED | OUTPATIENT
Start: 2022-04-25 | End: 2022-10-07

## 2022-04-25 RX ORDER — ERENUMAB-AOOE 70 MG/ML
INJECTION SUBCUTANEOUS
Qty: 1 ML | Refills: 0 | Status: SHIPPED | OUTPATIENT
Start: 2022-04-25 | End: 2022-06-01

## 2022-06-01 DIAGNOSIS — M79.7 FIBROMYALGIA: ICD-10-CM

## 2022-06-01 RX ORDER — ERENUMAB-AOOE 70 MG/ML
INJECTION SUBCUTANEOUS
Qty: 1 ML | Refills: 2 | Status: SHIPPED | OUTPATIENT
Start: 2022-06-01 | End: 2022-11-18

## 2022-06-01 NOTE — TELEPHONE ENCOUNTER
Rx Refill Note  Requested Prescriptions     Pending Prescriptions Disp Refills   • gabapentin (NEURONTIN) 800 MG tablet [Pharmacy Med Name: Gabapentin Oral Tablet 800 MG] 120 tablet 0     Sig: TAKE 1 TABLET BY MOUTH FOUR TIMES A DAY   • venlafaxine (EFFEXOR) 75 MG tablet [Pharmacy Med Name: Venlafaxine HCl Oral Tablet 75 MG] 270 tablet 0     Sig: TAKE 3 TABLETS BY MOUTH EVERY DAY      Last office visit with prescribing clinician: 1/27/2022      Next office visit with prescribing clinician: Visit date not found            Jose Miguel Saldaña MA  06/01/22, 16:28 EDT     Needs contract

## 2022-06-02 RX ORDER — VENLAFAXINE 75 MG/1
225 TABLET ORAL DAILY
Qty: 270 TABLET | Refills: 1 | Status: SHIPPED | OUTPATIENT
Start: 2022-06-02 | End: 2022-12-09

## 2022-06-02 RX ORDER — GABAPENTIN 800 MG/1
TABLET ORAL
Qty: 120 TABLET | Refills: 0 | Status: SHIPPED | OUTPATIENT
Start: 2022-06-02 | End: 2022-08-15

## 2022-06-28 ENCOUNTER — TELEPHONE (OUTPATIENT)
Dept: FAMILY MEDICINE CLINIC | Facility: CLINIC | Age: 47
End: 2022-06-28

## 2022-07-05 ENCOUNTER — TELEPHONE (OUTPATIENT)
Dept: FAMILY MEDICINE CLINIC | Facility: CLINIC | Age: 47
End: 2022-07-05

## 2022-07-05 NOTE — TELEPHONE ENCOUNTER
Pt calling back to Grace. I asked Pt if she was familiar with Eneida and to confirm she was requesting something with her. She said yes she is and she was requesting a new wheelchair.    Best call back: [phone]  Please advise

## 2022-07-06 NOTE — TELEPHONE ENCOUNTER
Unable to reach the pt. She has not been seen by Dr. Cooper since Jan 2022. She would need to be seen. LVM she can be seen in person or by video.

## 2022-07-26 ENCOUNTER — TELEMEDICINE (OUTPATIENT)
Dept: FAMILY MEDICINE CLINIC | Facility: CLINIC | Age: 47
End: 2022-07-26

## 2022-07-26 DIAGNOSIS — R19.5 DARK STOOLS: ICD-10-CM

## 2022-07-26 DIAGNOSIS — K59.03 DRUG-INDUCED CONSTIPATION: Primary | ICD-10-CM

## 2022-07-26 PROBLEM — Z98.890 S/P REPAIR OF VENTRAL HERNIA: Status: ACTIVE | Noted: 2022-07-12

## 2022-07-26 PROBLEM — E87.1 HYPONATREMIA: Status: ACTIVE | Noted: 2022-07-13

## 2022-07-26 PROBLEM — Z87.19 S/P REPAIR OF VENTRAL HERNIA: Status: ACTIVE | Noted: 2022-07-12

## 2022-07-26 PROBLEM — K43.6 INCARCERATED VENTRAL HERNIA: Status: ACTIVE | Noted: 2022-07-01

## 2022-07-26 PROBLEM — T82.9XXA VASCULAR PORT COMPLICATION: Status: ACTIVE | Noted: 2022-04-19

## 2022-07-26 PROCEDURE — 99213 OFFICE O/P EST LOW 20 MIN: CPT | Performed by: FAMILY MEDICINE

## 2022-07-26 RX ORDER — MAGNESIUM OXIDE 400 MG/1
0.5 TABLET ORAL 2 TIMES DAILY
COMMUNITY
Start: 2022-05-16 | End: 2023-02-21

## 2022-07-26 RX ORDER — METRONIDAZOLE 500 MG/1
TABLET ORAL
COMMUNITY
Start: 2022-07-21 | End: 2023-01-17

## 2022-07-26 RX ORDER — LANOLIN ALCOHOL/MO/W.PET/CERES
400 CREAM (GRAM) TOPICAL DAILY
Qty: 30 TABLET
Start: 2022-07-26 | End: 2022-07-26 | Stop reason: SDUPTHER

## 2022-07-26 RX ORDER — LEVOFLOXACIN 750 MG/1
750 TABLET ORAL DAILY
COMMUNITY
Start: 2022-07-21 | End: 2022-07-27

## 2022-07-26 RX ORDER — LORAZEPAM 1 MG/1
TABLET ORAL
COMMUNITY
Start: 2022-06-24

## 2022-07-26 RX ORDER — PROCHLORPERAZINE MALEATE 10 MG
TABLET ORAL
COMMUNITY
Start: 2022-06-22 | End: 2022-07-26

## 2022-08-15 DIAGNOSIS — M79.7 FIBROMYALGIA: ICD-10-CM

## 2022-08-15 RX ORDER — GABAPENTIN 800 MG/1
TABLET ORAL
Qty: 120 TABLET | Refills: 0 | Status: SHIPPED | OUTPATIENT
Start: 2022-08-15 | End: 2022-10-07

## 2022-08-15 NOTE — TELEPHONE ENCOUNTER
Rx Refill Note  Requested Prescriptions     Pending Prescriptions Disp Refills   • gabapentin (NEURONTIN) 800 MG tablet [Pharmacy Med Name: Gabapentin Oral Tablet 800 MG] 120 tablet 0     Sig: TAKE 1 TABLET BY MOUTH FOUR TIMES A DAY      Last office visit with prescribing clinician: 1/27/2022      Next office visit with prescribing clinician: Visit date not found            Alessandra Dunbar LPN  08/15/22, 12:22 EDT

## 2022-08-16 RX ORDER — DULOXETIN HYDROCHLORIDE 60 MG/1
CAPSULE, DELAYED RELEASE ORAL
Qty: 90 CAPSULE | Refills: 1 | Status: SHIPPED | OUTPATIENT
Start: 2022-08-16 | End: 2022-11-18

## 2022-08-16 NOTE — TELEPHONE ENCOUNTER
Rx Refill Note  Requested Prescriptions     Pending Prescriptions Disp Refills   • DULoxetine (CYMBALTA) 60 MG capsule [Pharmacy Med Name: DULoxetine HCl Oral Capsule Delayed Release Particles 60 MG] 90 capsule 0     Sig: TAKE 1 CAPSULE BY MOUTH EVERY DAY      Last office visit with prescribing clinician: 1/27/2022      Next office visit with prescribing clinician: Visit date not found            Alessandra Dunbar LPN  08/16/22, 08:33 EDT

## 2022-10-05 DIAGNOSIS — M79.7 FIBROMYALGIA: ICD-10-CM

## 2022-10-05 RX ORDER — GABAPENTIN 800 MG/1
TABLET ORAL
Qty: 120 TABLET | Refills: 0 | Status: CANCELLED | OUTPATIENT
Start: 2022-10-05

## 2022-10-06 NOTE — TELEPHONE ENCOUNTER
Rx Refill Note  Requested Prescriptions     Pending Prescriptions Disp Refills   • gabapentin (NEURONTIN) 800 MG tablet [Pharmacy Med Name: Gabapentin Oral Tablet 800 MG] 120 tablet 0     Sig: TAKE 1 TABLET BY MOUTH FOUR TIMES A DAY   • traZODone (DESYREL) 100 MG tablet [Pharmacy Med Name: traZODone HCl Oral Tablet 100 MG] 90 tablet 0     Sig: Administer 1 tablet per J tube Every Night.      Last office visit with prescribing clinician: 7/22 tele med     Next office visit with prescribing clinician: Visit date not found            Alessandra Dunbar LPN  10/06/22, 08:38 EDT

## 2022-10-07 DIAGNOSIS — M79.7 FIBROMYALGIA: ICD-10-CM

## 2022-10-07 RX ORDER — GABAPENTIN 800 MG/1
TABLET ORAL
Qty: 120 TABLET | Refills: 0 | Status: SHIPPED | OUTPATIENT
Start: 2022-10-07 | End: 2022-11-18

## 2022-10-07 RX ORDER — TRAZODONE HYDROCHLORIDE 100 MG/1
100 TABLET ORAL NIGHTLY
Qty: 90 TABLET | Refills: 0 | Status: SHIPPED | OUTPATIENT
Start: 2022-10-07 | End: 2022-12-19

## 2022-10-07 NOTE — TELEPHONE ENCOUNTER
Due for her 6 month visit in January. Can do in person and do contract that day. Thanks. Last visit was changed to virtual because she was just out of the hospital and trouble getting around.

## 2022-10-10 RX ORDER — GABAPENTIN 800 MG/1
TABLET ORAL
Qty: 120 TABLET | Refills: 0 | OUTPATIENT
Start: 2022-10-10

## 2022-10-18 NOTE — TELEPHONE ENCOUNTER
Called this patient back and left her detailed VM asking her to call this nurse back to schedule with Allison.

## 2022-10-20 ENCOUNTER — TELEPHONE (OUTPATIENT)
Dept: FAMILY MEDICINE CLINIC | Facility: CLINIC | Age: 47
End: 2022-10-20

## 2022-11-17 DIAGNOSIS — M79.7 FIBROMYALGIA: ICD-10-CM

## 2022-11-18 RX ORDER — ERENUMAB-AOOE 70 MG/ML
INJECTION SUBCUTANEOUS
Qty: 1 ML | Refills: 1 | Status: SHIPPED | OUTPATIENT
Start: 2022-11-18 | End: 2023-02-02

## 2022-11-18 RX ORDER — GABAPENTIN 800 MG/1
TABLET ORAL
Qty: 120 TABLET | Refills: 0 | Status: SHIPPED | OUTPATIENT
Start: 2022-11-18 | End: 2023-01-17 | Stop reason: SDUPTHER

## 2022-11-18 RX ORDER — DULOXETIN HYDROCHLORIDE 60 MG/1
CAPSULE, DELAYED RELEASE ORAL
Qty: 90 CAPSULE | Refills: 0 | Status: SHIPPED | OUTPATIENT
Start: 2022-11-18 | End: 2023-01-17 | Stop reason: SDUPTHER

## 2022-11-18 NOTE — TELEPHONE ENCOUNTER
Rx Refill Note  Requested Prescriptions     Pending Prescriptions Disp Refills   • Aimovig 70 MG/ML prefilled syringe [Pharmacy Med Name: Aimovig Subcutaneous Solution Auto-injector 70 MG/ML] 1 mL 0     Sig: INJECT 1 ML UNDER THE SKIN INTO THE APPROPRIATE AREA AS DIRECTED ONE TIME FOR 1 DOSE   • DULoxetine (CYMBALTA) 60 MG capsule [Pharmacy Med Name: DULoxetine HCl Oral Capsule Delayed Release Particles 60 MG] 90 capsule 0     Sig: TAKE 1 CAPSULE BY MOUTH EVERY DAY   • gabapentin (NEURONTIN) 800 MG tablet [Pharmacy Med Name: Gabapentin Oral Tablet 800 MG] 120 tablet 0     Sig: TAKE 1 TABLET BY MOUTH FOUR TIMES A DAY      Last office visit with prescribing clinician: 1/27/2022      Next office visit with prescribing clinician: 1/17/2023            Alessandra Dunbar LPN  11/18/22, 08:14 EST

## 2022-12-09 RX ORDER — VENLAFAXINE 75 MG/1
225 TABLET ORAL DAILY
Qty: 270 TABLET | Refills: 0 | Status: SHIPPED | OUTPATIENT
Start: 2022-12-09 | End: 2023-02-06 | Stop reason: SDUPTHER

## 2022-12-19 RX ORDER — TRAZODONE HYDROCHLORIDE 100 MG/1
100 TABLET ORAL NIGHTLY
Qty: 90 TABLET | Refills: 0 | Status: SHIPPED | OUTPATIENT
Start: 2022-12-19 | End: 2023-02-21 | Stop reason: SDUPTHER

## 2023-01-17 ENCOUNTER — OFFICE VISIT (OUTPATIENT)
Dept: FAMILY MEDICINE CLINIC | Facility: CLINIC | Age: 48
End: 2023-01-17
Payer: MEDICARE

## 2023-01-17 VITALS
TEMPERATURE: 98.2 F | OXYGEN SATURATION: 96 % | HEART RATE: 92 BPM | DIASTOLIC BLOOD PRESSURE: 68 MMHG | RESPIRATION RATE: 17 BRPM | SYSTOLIC BLOOD PRESSURE: 112 MMHG

## 2023-01-17 DIAGNOSIS — N95.1 SWEATS, MENOPAUSAL: ICD-10-CM

## 2023-01-17 DIAGNOSIS — M79.7 FIBROMYALGIA: ICD-10-CM

## 2023-01-17 DIAGNOSIS — F32.A ANXIETY AND DEPRESSION: Primary | ICD-10-CM

## 2023-01-17 DIAGNOSIS — F41.9 ANXIETY AND DEPRESSION: Primary | ICD-10-CM

## 2023-01-17 PROBLEM — K94.13 MALFUNCTION OF JEJUNOSTOMY TUBE: Status: ACTIVE | Noted: 2022-08-15

## 2023-01-17 PROCEDURE — 1170F FXNL STATUS ASSESSED: CPT | Performed by: FAMILY MEDICINE

## 2023-01-17 PROCEDURE — G0439 PPPS, SUBSEQ VISIT: HCPCS | Performed by: FAMILY MEDICINE

## 2023-01-17 PROCEDURE — 96160 PT-FOCUSED HLTH RISK ASSMT: CPT | Performed by: FAMILY MEDICINE

## 2023-01-17 PROCEDURE — 1160F RVW MEDS BY RX/DR IN RCRD: CPT | Performed by: FAMILY MEDICINE

## 2023-01-17 PROCEDURE — 99214 OFFICE O/P EST MOD 30 MIN: CPT | Performed by: FAMILY MEDICINE

## 2023-01-17 RX ORDER — METHADONE HYDROCHLORIDE 10 MG/1
10 TABLET ORAL EVERY 6 HOURS PRN
COMMUNITY
End: 2023-01-17

## 2023-01-17 RX ORDER — PROMETHAZINE HYDROCHLORIDE 25 MG/1
50 TABLET ORAL EVERY 6 HOURS PRN
COMMUNITY

## 2023-01-17 RX ORDER — NALOXONE HYDROCHLORIDE 4 MG/.1ML
1 SPRAY NASAL AS NEEDED
Qty: 1 EACH | Refills: 3 | Status: SHIPPED | OUTPATIENT
Start: 2023-01-17 | End: 2023-01-24 | Stop reason: SDUPTHER

## 2023-01-17 RX ORDER — GABAPENTIN 800 MG/1
800 TABLET ORAL 4 TIMES DAILY
Qty: 360 TABLET | Refills: 0 | Status: SHIPPED | OUTPATIENT
Start: 2023-01-17

## 2023-01-17 RX ORDER — DULOXETIN HYDROCHLORIDE 30 MG/1
30 CAPSULE, DELAYED RELEASE ORAL DAILY
Qty: 14 CAPSULE | Refills: 0 | Status: SHIPPED | OUTPATIENT
Start: 2023-01-17 | End: 2023-02-06

## 2023-01-17 RX ORDER — POTASSIUM CHLORIDE 750 MG/1
10 TABLET, FILM COATED, EXTENDED RELEASE ORAL 2 TIMES DAILY
COMMUNITY

## 2023-01-24 RX ORDER — NALOXONE HYDROCHLORIDE 4 MG/.1ML
1 SPRAY NASAL AS NEEDED
Qty: 1 EACH | Refills: 0 | Status: SHIPPED | OUTPATIENT
Start: 2023-01-24 | End: 2023-01-24 | Stop reason: SDUPTHER

## 2023-01-24 RX ORDER — NALOXONE HYDROCHLORIDE 4 MG/.1ML
1 SPRAY NASAL AS NEEDED
Qty: 1 EACH | Refills: 0 | Status: SHIPPED | OUTPATIENT
Start: 2023-01-24

## 2023-01-24 NOTE — TELEPHONE ENCOUNTER
Spoke with pharmacist on duty. New order that was sent over today still did not approve. Pharmacist came back with the RX went over as STEPHENIE and if we resend rx it should go through. Spoke with Allison, she gave ok to remove STEPHENIE on rx and to resend again today.

## 2023-02-02 RX ORDER — ERENUMAB-AOOE 70 MG/ML
INJECTION SUBCUTANEOUS
Qty: 1 ML | Refills: 0 | Status: SHIPPED | OUTPATIENT
Start: 2023-02-02 | End: 2023-03-16 | Stop reason: SDUPTHER

## 2023-02-02 NOTE — TELEPHONE ENCOUNTER
Rx Refill Note  Requested Prescriptions     Pending Prescriptions Disp Refills   • Aimovig 70 MG/ML auto-injector [Pharmacy Med Name: Aimovig Subcutaneous Solution Auto-injector 70 MG/ML] 1 mL 0     Sig: INJECT 1 ML UNDER THE SKIN INTO THE APPROPRIATE AREA AS DIRECTED ONE TIME FOR 1 DOSE      Last office visit with prescribing clinician: 1/17/2023   Last telemedicine visit with prescribing clinician: 2/21/2023   Next office visit with prescribing clinician: 2/21/2023                         Would you like a call back once the refill request has been completed: [] Yes [] No    If the office needs to give you a call back, can they leave a voicemail: [] Yes [] No    Alessandra Dunbar LPN  02/02/23, 07:59 EST

## 2023-02-06 RX ORDER — VENLAFAXINE 75 MG/1
75 TABLET ORAL DAILY
Qty: 14 TABLET | Refills: 0 | Status: SHIPPED | OUTPATIENT
Start: 2023-02-06 | End: 2023-02-06

## 2023-02-06 RX ORDER — VENLAFAXINE 75 MG/1
150 TABLET ORAL DAILY
Qty: 14 TABLET | Refills: 0 | Status: SHIPPED | OUTPATIENT
Start: 2023-02-06 | End: 2023-02-06 | Stop reason: SDUPTHER

## 2023-02-06 RX ORDER — VENLAFAXINE 75 MG/1
150 TABLET ORAL DAILY
Qty: 28 TABLET | Refills: 0 | Status: SHIPPED | OUTPATIENT
Start: 2023-02-06 | End: 2023-04-08 | Stop reason: SDUPTHER

## 2023-02-06 RX ORDER — VENLAFAXINE HYDROCHLORIDE 37.5 MG/1
TABLET, EXTENDED RELEASE ORAL
Qty: 30 EACH | Refills: 0 | Status: SHIPPED | OUTPATIENT
Start: 2023-02-06 | End: 2023-02-28

## 2023-02-21 ENCOUNTER — TELEMEDICINE (OUTPATIENT)
Dept: FAMILY MEDICINE CLINIC | Facility: CLINIC | Age: 48
End: 2023-02-21
Payer: MEDICARE

## 2023-02-21 DIAGNOSIS — F32.A ANXIETY AND DEPRESSION: ICD-10-CM

## 2023-02-21 DIAGNOSIS — K21.9 GASTROESOPHAGEAL REFLUX DISEASE, UNSPECIFIED WHETHER ESOPHAGITIS PRESENT: ICD-10-CM

## 2023-02-21 DIAGNOSIS — F51.01 PRIMARY INSOMNIA: ICD-10-CM

## 2023-02-21 DIAGNOSIS — F33.42 RECURRENT MAJOR DEPRESSIVE DISORDER, IN FULL REMISSION: Primary | ICD-10-CM

## 2023-02-21 DIAGNOSIS — F41.9 ANXIETY AND DEPRESSION: ICD-10-CM

## 2023-02-21 PROCEDURE — 99213 OFFICE O/P EST LOW 20 MIN: CPT | Performed by: FAMILY MEDICINE

## 2023-02-21 RX ORDER — TRAZODONE HYDROCHLORIDE 100 MG/1
200 TABLET ORAL NIGHTLY
Qty: 180 TABLET | Refills: 1 | Status: SHIPPED | OUTPATIENT
Start: 2023-02-21

## 2023-02-21 RX ORDER — FAMOTIDINE 20 MG/1
20 TABLET, FILM COATED ORAL 2 TIMES DAILY
COMMUNITY

## 2023-02-21 RX ORDER — LANOLIN ALCOHOL/MO/W.PET/CERES
0.5 CREAM (GRAM) TOPICAL 2 TIMES DAILY
COMMUNITY
Start: 2023-02-06

## 2023-03-13 RX ORDER — CYCLOBENZAPRINE HCL 10 MG
TABLET ORAL
Qty: 180 TABLET | Refills: 0 | Status: SHIPPED | OUTPATIENT
Start: 2023-03-13

## 2023-03-16 RX ORDER — ERENUMAB-AOOE 70 MG/ML
INJECTION SUBCUTANEOUS
Qty: 3 ML | Refills: 1 | Status: SHIPPED | OUTPATIENT
Start: 2023-03-16

## 2023-03-16 NOTE — TELEPHONE ENCOUNTER
Rx Refill Note  Requested Prescriptions     Pending Prescriptions Disp Refills   • Aimovig 70 MG/ML auto-injector [Pharmacy Med Name: Aimovig Subcutaneous Solution Auto-injector 70 MG/ML] 1 mL 0     Sig: INJECT 1 ML UNDER THE SKIN IN THE APPROPRIATE AREA AS DIRECTED ONCE FOR 1 DOSE      Last office visit with prescribing clinician: 1/17/2023   Last telemedicine visit with prescribing clinician: Visit date not found   Next office visit with prescribing clinician: Visit date not found                         Would you like a call back once the refill request has been completed: [] Yes [] No    If the office needs to give you a call back, can they leave a voicemail: [] Yes [] No    Alessandra Dunbar LPN  03/16/23, 13:59 EDT

## 2023-03-21 ENCOUNTER — TELEPHONE (OUTPATIENT)
Dept: FAMILY MEDICINE CLINIC | Facility: CLINIC | Age: 48
End: 2023-03-21

## 2023-03-21 ENCOUNTER — HOME HEALTH ADMISSION (OUTPATIENT)
Dept: HOME HEALTH SERVICES | Facility: HOME HEALTHCARE | Age: 48
End: 2023-03-21
Payer: MEDICARE

## 2023-03-21 DIAGNOSIS — K31.84 GASTROPARESIS: Primary | ICD-10-CM

## 2023-03-21 DIAGNOSIS — E86.0 DEHYDRATION: ICD-10-CM

## 2023-03-21 DIAGNOSIS — K90.49 FOOD INTOLERANCE: ICD-10-CM

## 2023-03-21 NOTE — TELEPHONE ENCOUNTER
Caller: ShawneeOz    Relationship: Emergency Contact    Best call back number: [phone]    Requested Prescriptions:   Requested Prescriptions     Pending Prescriptions Disp Refills   • Cariprazine HCl (Vraylar) 1.5 MG capsule capsule 30 capsule 1     Sig: Start by taking every other day for one month.        Pharmacy where request should be sent: [id number] [address] [facility] [phone] [facility] [phone]      Last office visit with prescribing clinician: 1/17/2023   Last telemedicine visit with prescribing clinician: Visit date not found   Next office visit with prescribing clinician: Visit date not found     Additional details provided by patient: PATIENT IS OUT OF MEDICATION. PATIENT IS REQUESTING A 30 DAY SUPPLY      Does the patient have less than a 3 day supply:  [x] Yes  [] No    Would you like a call back once the refill request has been completed: [] Yes [x] No    If the office needs to give you a call back, can they leave a voicemail: [] Yes [x] No    Itzel Thomas Rep   03/21/23 15:04 EDT

## 2023-03-21 NOTE — TELEPHONE ENCOUNTER
Julian from [facility] unable to accept referral for Home Health they don't accept pt insurance [employer].

## 2023-03-21 NOTE — TELEPHONE ENCOUNTER
Rx Refill Note  Requested Prescriptions     Pending Prescriptions Disp Refills   • Cariprazine HCl (Vraylar) 1.5 MG capsule capsule 30 capsule 1     Sig: Start by taking every other day for one month.      Last office visit with prescribing clinician: 1/17/2023   Last telemedicine visit with prescribing clinician: Visit date not found   Next office visit with prescribing clinician: Visit date not found                         Would you like a call back once the refill request has been completed: [] Yes [] No    If the office needs to give you a call back, can they leave a voicemail: [] Yes [] No    Alessandra Dunbar LPN  03/21/23, 15:46 EDT

## 2023-03-23 NOTE — TELEPHONE ENCOUNTER
Caller: MARTIN    Relationship:     Best call back number: [phone]    Requested Prescriptions:   Requested Prescriptions     Pending Prescriptions Disp Refills   • Cariprazine HCl (Vraylar) 1.5 MG capsule capsule 30 capsule 1     Sig: Start by taking every other day for one month.        Pharmacy where request should be sent: [facility] [id number] [address] [facility] [phone] [facility] [phone]      Last office visit with prescribing clinician: 1/17/2023   Last telemedicine visit with prescribing clinician: Visit date not found   Next office visit with prescribing clinician: Visit date not found     Additional details provided by patient: COMPLETELY OUT OF MEDICATION . INSURANCE WILL NOT FILL ANOTHER 15 DAY AT THIS TIME. OUT OF POCKET WOULD BE $700 FOR 15 PILLS.  REQUESTING A NEW 30 DAY PRESCRIPTION CALLED FOR 1 A DAY FOR 30 DAYS PER .    PATIENT HAS BEEN OUT OF MEDICATION FOR 3 DAYS(TODAY BEING THE 3RD DAY)    Does the patient have less than a 3 day supply:  [x] Yes  [] No    Would you like a call back once the refill request has been completed: [x] Yes [] No    If the office needs to give you a call back, can they leave a voicemail: [] Yes [x] No     REQUEST A CALL BACK TO DISCUSS THIS SITUATION PLEASE, ASAP.      Itzel Mitchell Rep   03/23/23 11:18 EDT

## 2023-04-07 DIAGNOSIS — G43.009 MIGRAINE WITHOUT AURA AND WITHOUT STATUS MIGRAINOSUS, NOT INTRACTABLE: ICD-10-CM

## 2023-04-07 NOTE — TELEPHONE ENCOUNTER
Rx Refill Note  Requested Prescriptions     Pending Prescriptions Disp Refills   • venlafaxine (EFFEXOR) 75 MG tablet [Pharmacy Med Name: Venlafaxine HCl Oral Tablet 75 MG] 14 tablet 0     Sig: TAKE 1 TABLET BY MOUTH EVERY DAY per J TUBE FOR WEANING DOSE   • rizatriptan MLT (MAXALT-MLT) 10 MG disintegrating tablet [Pharmacy Med Name: Rizatriptan Benzoate Oral Tablet Disintegrating 10 MG] 12 tablet 0     Sig: DISSOLVE 1 TABLET BY MOUTH AS NEEDED FOR MIGRAINE, MAY REPEAT ONCE IN 24 HOURS, MAX 2 TABS PER 24 HOURS      Last office visit with prescribing clinician: 1/17/2023   Last telemedicine visit with prescribing clinician: Visit date not found   Next office visit with prescribing clinician: Visit date not found       {TIP  Please add Last Relevant Lab Date if appropriate: 03/27/23                 Would you like a call back once the refill request has been completed: [] Yes [] No    If the office needs to give you a call back, can they leave a voicemail: [] Yes [] No    Laila Rosario MA  04/07/23, 12:43 EDT

## 2023-04-08 RX ORDER — RIZATRIPTAN BENZOATE 10 MG/1
TABLET, ORALLY DISINTEGRATING ORAL
Qty: 12 TABLET | Refills: 0 | Status: SHIPPED | OUTPATIENT
Start: 2023-04-08

## 2023-04-08 RX ORDER — VENLAFAXINE 75 MG/1
TABLET ORAL
Qty: 14 TABLET | Refills: 0 | Status: SHIPPED | OUTPATIENT
Start: 2023-04-08

## 2023-05-11 DIAGNOSIS — M79.7 FIBROMYALGIA: ICD-10-CM

## 2023-05-11 NOTE — TELEPHONE ENCOUNTER
Rx Refill Note  Requested Prescriptions     Pending Prescriptions Disp Refills   • venlafaxine (EFFEXOR) 75 MG tablet [Pharmacy Med Name: Venlafaxine HCl Oral Tablet 75 MG] 14 tablet 0     Sig: TAKE 2 TABLETS BY MOUTH EVERY DAY PER J TUBE **WEANING DOSE**   • gabapentin (NEURONTIN) 800 MG tablet [Pharmacy Med Name: Gabapentin Oral Tablet 800 MG] 360 tablet 0     Sig: TAKE 1 TABLET BY MOUTH FOUR TIMES A DAY      Last office visit with prescribing clinician: 1/17/2023   Last telemedicine visit with prescribing clinician: 5/9/2023   Next office visit with prescribing clinician: Visit date not found                         Would you like a call back once the refill request has been completed: [] Yes [] No    If the office needs to give you a call back, can they leave a voicemail: [] Yes [] No    Alessandra Dunbar LPN  05/11/23, 15:26 EDT

## 2023-05-12 DIAGNOSIS — M79.7 FIBROMYALGIA: ICD-10-CM

## 2023-05-12 RX ORDER — GABAPENTIN 800 MG/1
TABLET ORAL
Qty: 360 TABLET | Refills: 0 | Status: SHIPPED | OUTPATIENT
Start: 2023-05-12 | End: 2023-05-12 | Stop reason: SDUPTHER

## 2023-05-12 RX ORDER — GABAPENTIN 800 MG/1
800 TABLET ORAL 4 TIMES DAILY
Qty: 360 TABLET | Refills: 0 | Status: SHIPPED | OUTPATIENT
Start: 2023-05-12

## 2023-05-12 RX ORDER — VENLAFAXINE 75 MG/1
TABLET ORAL
Qty: 14 TABLET | Refills: 0 | OUTPATIENT
Start: 2023-05-12

## 2024-06-03 ENCOUNTER — APPOINTMENT (OUTPATIENT)
Dept: GENERAL RADIOLOGY | Facility: HOSPITAL | Age: 49
End: 2024-06-03
Payer: MEDICARE

## 2024-06-03 ENCOUNTER — HOSPITAL ENCOUNTER (OUTPATIENT)
Facility: HOSPITAL | Age: 49
Discharge: HOME | End: 2024-06-03
Attending: EMERGENCY MEDICINE | Admitting: EMERGENCY MEDICINE
Payer: MEDICARE

## 2024-06-03 VITALS
HEIGHT: 62 IN | HEART RATE: 100 BPM | WEIGHT: 175 LBS | DIASTOLIC BLOOD PRESSURE: 85 MMHG | OXYGEN SATURATION: 95 % | TEMPERATURE: 98.7 F | SYSTOLIC BLOOD PRESSURE: 138 MMHG | RESPIRATION RATE: 20 BRPM | BODY MASS INDEX: 32.2 KG/M2

## 2024-06-03 DIAGNOSIS — M54.50 ACUTE MIDLINE LOW BACK PAIN WITHOUT SCIATICA: Primary | ICD-10-CM
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
## 2024-06-03 DIAGNOSIS — M79.672 LEFT FOOT PAIN: ICD-10-CM

## 2024-06-03 PROCEDURE — G0463 HOSPITAL OUTPT CLINIC VISIT: HCPCS | Performed by: NURSE PRACTITIONER

## 2024-06-03 PROCEDURE — 73630 X-RAY EXAM OF FOOT: CPT

## 2024-06-03 PROCEDURE — 99213 OFFICE O/P EST LOW 20 MIN: CPT | Performed by: NURSE PRACTITIONER

## 2024-06-03 PROCEDURE — 72110 X-RAY EXAM L-2 SPINE 4/>VWS: CPT

## 2024-06-03 PROCEDURE — 73610 X-RAY EXAM OF ANKLE: CPT

## 2024-06-03 NOTE — FSED PROVIDER NOTE
Subjective   History of Present Illness  47 y/o female presents to ER with c/o left foot, ankle and back pain s/p twisting and falling yesterday.      Review of Systems   Musculoskeletal:  Positive for back pain.        + left foot and left ankle pain s/p twisting it yesterday.       Past Medical History:   Diagnosis Date    Acute deep vein thrombosis (DVT) of brachial vein of left upper extremity 11/5/2021    Anxiety     Central line clotted 7/21/2020    Common bile duct stone 11/23/2021    Elevated factor VIII level     Failure to thrive in adult 10/29/2021    Fibromyalgia, primary     GERD (gastroesophageal reflux disease) 8/17/2020    Headache     Insomnia     Major depressive disorder 4/11/2019    Numbness and tingling of upper and lower extremities of both sides     Other dietary vitamin B12 deficiency anemia 11/9/2018    POTS (postural orthostatic tachycardia syndrome)     Pulmonary embolism 10/1/2020    Rheumatoid arthritis     Sjogren's syndrome     Urinary tract infection        Allergies   Allergen Reactions    Cefazolin Itching    Chlorhexidine Rash    Morphine Unknown (See Comments)     Patient trouble awaking 24 hours after one dose.    Vancomycin Hives    Hydroxyzine Itching    Povidone Iodine Rash    Pregabalin Angioedema and Swelling    Adhesive Tape Other (See Comments)     SKIN TEARS OR CUTS (OTHER THAN SURGERY):FIND:PT:^PATIENT:ORD:MDS    Butorphanol     Meperidine     Phenergan [Promethazine] Dystonia    Reglan [Metoclopramide] Dystonia    Keflex [Cephalexin] Rash       Past Surgical History:   Procedure Laterality Date    BONE MARROW BIOPSY      CHOLECYSTECTOMY      COLONOSCOPY      GASTROSTOMY-JEJEUNOSTOMY TUBE CHANGE/PLACEMENT  03/2019    TONSILLECTOMY      TOTAL ABDOMINAL HYSTERECTOMY WITH SALPINGO OOPHORECTOMY  2007    ovarian cysts, menorrhagia, anemia    TUNNELED VENOUS CATHETER PLACEMENT         Family History   Problem Relation Age of Onset    Rheum arthritis Mother     Lupus Mother      Bipolar disorder Mother     Skin cancer Mother     Thyroid disease Mother     Skin cancer Father     Heart disease Father     Stroke Father     Transient ischemic attack Father     Migraines Brother     Anxiety disorder Brother     Raynaud syndrome Brother     Skin cancer Brother     Lupus Maternal Grandmother     Cirrhosis Maternal Grandmother         non-alcoholic    Heart disease Maternal Grandmother     Rheum arthritis Maternal Grandmother     Ulcerative colitis Maternal Grandmother     Psoriasis Daughter     Cirrhosis Maternal Aunt         non-alcoholic    Heart disease Maternal Aunt     Lupus Maternal Aunt     Thyroid disease Maternal Aunt     Cirrhosis Maternal Uncle         non-alcoholic    Heart disease Paternal Uncle     Breast cancer Neg Hx     Colon cancer Neg Hx        Social History     Socioeconomic History    Marital status:      Spouse name: Oz Mallory   Tobacco Use    Smoking status: Never    Smokeless tobacco: Never   Substance and Sexual Activity    Alcohol use: No    Drug use: No    Sexual activity: Yes     Partners: Male           Objective   Physical Exam  Constitutional:       General: She is not in acute distress.     Appearance: Normal appearance. She is not ill-appearing, toxic-appearing or diaphoretic.   Musculoskeletal:      Cervical back: Normal.      Thoracic back: Normal.      Lumbar back: Tenderness present.        Back:         Legs:    Skin:     General: Skin is warm and dry.      Capillary Refill: Capillary refill takes less than 2 seconds.   Neurological:      General: No focal deficit present.      Mental Status: She is alert and oriented to person, place, and time.   Psychiatric:         Mood and Affect: Mood normal.         Behavior: Behavior normal.         Thought Content: Thought content normal.         Judgment: Judgment normal.         Procedures           ED Course      XR Spine Lumbar Complete 4+VW    Result Date: 6/3/2024  Impression: Age-indeterminate L1  "and likely T12 vertebral body compression fractures. Findings are new from 2019 abdominal radiographs. Percutaneous enteric tube with distal tip appearing coiled within the stomach. Electronically Signed: Sincere Kan MD  6/3/2024 4:38 PM EDT  Workstation ID: [id number]    XR Ankle 3+ View Left    Result Date: 6/3/2024  Impression: No acute abnormality of the left foot or ankle. Electronically Signed: Sincere Kan MD  6/3/2024 4:35 PM EDT  Workstation ID: [id number]    XR Foot 3+ View Left    Result Date: 6/3/2024  Impression: No acute abnormality of the left foot or ankle. Electronically Signed: Sincere Kan MD  6/3/2024 4:35 PM EDT  Workstation ID: [id number]                                       Medical Decision Making  47 y/o female presents to ER with c/o left foot and ankle pain and back pain s/p fall.    Problems Addressed:  Acute midline low back pain without sciatica:     Details: Patient reports numerous falls with one resulting in \"burst fractures\" in [id number].  XR reveals undetermined possible compression fractures new since 2019.  Discussed results with patient and advised to wear TSLO brace and schedule appointment with her Neurosug within the next few days.  Patient verbalized understanding.  Left foot pain:     Details: XR reveals no acute abnormality.    Amount and/or Complexity of Data Reviewed  Radiology: ordered.    Risk  Prescription drug management.  Risk Details: D/c home to self care.  Wear TSLO brace and call her NeuroSug to schedule an appointment for further eval and care.        Final diagnoses:   Acute midline low back pain without sciatica   Fall, initial encounter   Left foot pain       ED Disposition  ED Disposition       ED Disposition   Discharge    Condition   Stable    Comment   --               Yvette Cooper MD  [address] [place] [address]  [phone]    Schedule an appointment as soon as possible for a visit in 3 days  As needed, If symptoms worsen       "   Medication List      No changes were made to your prescriptions during this visit.

## 2024-06-03 NOTE — DISCHARGE INSTRUCTIONS
Wear your TLSO brace and schedule an appointment with your Neuro-surg within the next few days.  Rest and continue previously medications for pain.

## 2025-05-26 ENCOUNTER — APPOINTMENT (OUTPATIENT)
Dept: GENERAL RADIOLOGY | Facility: HOSPITAL | Age: 50
End: 2025-05-26
Payer: MEDICARE

## 2025-05-26 ENCOUNTER — APPOINTMENT (OUTPATIENT)
Dept: CT IMAGING | Facility: HOSPITAL | Age: 50
End: 2025-05-26
Payer: MEDICARE

## 2025-05-26 ENCOUNTER — HOSPITAL ENCOUNTER (EMERGENCY)
Facility: HOSPITAL | Age: 50
Discharge: HOME | End: 2025-05-26
Attending: EMERGENCY MEDICINE | Admitting: EMERGENCY MEDICINE
Payer: MEDICARE

## 2025-05-26 VITALS
TEMPERATURE: 98.1 F | DIASTOLIC BLOOD PRESSURE: 59 MMHG | WEIGHT: 159 LBS | HEART RATE: 85 BPM | BODY MASS INDEX: 29.26 KG/M2 | HEIGHT: 62 IN | RESPIRATION RATE: 16 BRPM | SYSTOLIC BLOOD PRESSURE: 101 MMHG | OXYGEN SATURATION: 98 %

## 2025-05-26 DIAGNOSIS — J18.9 PNEUMONIA OF RIGHT LOWER LOBE DUE TO INFECTIOUS ORGANISM: Primary | ICD-10-CM

## 2025-05-26 LAB
ALBUMIN SERPL-MCNC: 4.4 G/DL (ref 3.5–5.2)
ALBUMIN/GLOB SERPL: 1.3 G/DL
ALP SERPL-CCNC: 145 U/L (ref 39–117)
ALT SERPL W P-5'-P-CCNC: 23 U/L (ref 1–33)
ANION GAP SERPL CALCULATED.3IONS-SCNC: 13.2 MMOL/L (ref 5–15)
AST SERPL-CCNC: 33 U/L (ref 1–32)
B PARAPERT DNA SPEC QL NAA+PROBE: NOT DETECTED
B PERT DNA SPEC QL NAA+PROBE: NOT DETECTED
BASOPHILS # BLD AUTO: 0.03 10*3/MM3 (ref 0–0.2)
BASOPHILS NFR BLD AUTO: 0.4 % (ref 0–1.5)
BILIRUB SERPL-MCNC: 0.3 MG/DL (ref 0–1.2)
BUN SERPL-MCNC: 9 MG/DL (ref 6–20)
BUN/CREAT SERPL: 7.6 (ref 7–25)
C PNEUM DNA NPH QL NAA+NON-PROBE: NOT DETECTED
CALCIUM SPEC-SCNC: 8.9 MG/DL (ref 8.6–10.5)
CHLORIDE SERPL-SCNC: 98 MMOL/L (ref 98–107)
CO2 SERPL-SCNC: 22.8 MMOL/L (ref 22–29)
CREAT SERPL-MCNC: 1.18 MG/DL (ref 0.57–1)
D DIMER PPP FEU-MCNC: 0.59 MCGFEU/ML (ref 0–0.5)
D-LACTATE SERPL-SCNC: 1 MMOL/L (ref 0.5–2)
DEPRECATED RDW RBC AUTO: 42.2 FL (ref 37–54)
EGFRCR SERPLBLD CKD-EPI 2021: 56.4 ML/MIN/1.73
EOSINOPHIL # BLD AUTO: 0.02 10*3/MM3 (ref 0–0.4)
EOSINOPHIL NFR BLD AUTO: 0.2 % (ref 0.3–6.2)
ERYTHROCYTE [DISTWIDTH] IN BLOOD BY AUTOMATED COUNT: 12.9 % (ref 12.3–15.4)
FLUAV SUBTYP SPEC NAA+PROBE: NOT DETECTED
FLUAV SUBTYP SPEC NAA+PROBE: NOT DETECTED
FLUBV RNA ISLT QL NAA+PROBE: NOT DETECTED
FLUBV RNA ISLT QL NAA+PROBE: NOT DETECTED
GLOBULIN UR ELPH-MCNC: 3.4 GM/DL
GLUCOSE SERPL-MCNC: 120 MG/DL (ref 65–99)
HADV DNA SPEC NAA+PROBE: NOT DETECTED
HCOV 229E RNA SPEC QL NAA+PROBE: NOT DETECTED
HCOV HKU1 RNA SPEC QL NAA+PROBE: NOT DETECTED
HCOV NL63 RNA SPEC QL NAA+PROBE: NOT DETECTED
HCOV OC43 RNA SPEC QL NAA+PROBE: NOT DETECTED
HCT VFR BLD AUTO: 42.4 % (ref 34–46.6)
HGB BLD-MCNC: 13.3 G/DL (ref 12–15.9)
HMPV RNA NPH QL NAA+NON-PROBE: NOT DETECTED
HPIV1 RNA ISLT QL NAA+PROBE: NOT DETECTED
HPIV2 RNA SPEC QL NAA+PROBE: NOT DETECTED
HPIV3 RNA NPH QL NAA+PROBE: DETECTED
HPIV4 P GENE NPH QL NAA+PROBE: NOT DETECTED
IMM GRANULOCYTES # BLD AUTO: 0.03 10*3/MM3 (ref 0–0.05)
IMM GRANULOCYTES NFR BLD AUTO: 0.4 % (ref 0–0.5)
LYMPHOCYTES # BLD AUTO: 0.88 10*3/MM3 (ref 0.7–3.1)
LYMPHOCYTES NFR BLD AUTO: 10.8 % (ref 19.6–45.3)
M PNEUMO IGG SER IA-ACNC: NOT DETECTED
MCH RBC QN AUTO: 27.9 PG (ref 26.6–33)
MCHC RBC AUTO-ENTMCNC: 31.4 G/DL (ref 31.5–35.7)
MCV RBC AUTO: 88.9 FL (ref 79–97)
MONOCYTES # BLD AUTO: 0.76 10*3/MM3 (ref 0.1–0.9)
MONOCYTES NFR BLD AUTO: 9.3 % (ref 5–12)
NEUTROPHILS NFR BLD AUTO: 6.43 10*3/MM3 (ref 1.7–7)
NEUTROPHILS NFR BLD AUTO: 78.9 % (ref 42.7–76)
PLATELET # BLD AUTO: 155 10*3/MM3 (ref 140–450)
PMV BLD AUTO: 10.6 FL (ref 6–12)
POTASSIUM SERPL-SCNC: 3.8 MMOL/L (ref 3.5–5.2)
PROT SERPL-MCNC: 7.8 G/DL (ref 6–8.5)
RBC # BLD AUTO: 4.77 10*6/MM3 (ref 3.77–5.28)
RHINOVIRUS RNA SPEC NAA+PROBE: NOT DETECTED
RSV RNA NPH QL NAA+NON-PROBE: NOT DETECTED
RSV RNA NPH QL NAA+NON-PROBE: NOT DETECTED
SARS-COV-2 RNA RESP QL NAA+PROBE: NOT DETECTED
SARS-COV-2 RNA RESP QL NAA+PROBE: NOT DETECTED
SODIUM SERPL-SCNC: 134 MMOL/L (ref 136–145)
WBC NRBC COR # BLD AUTO: 8.15 10*3/MM3 (ref 3.4–10.8)

## 2025-05-26 PROCEDURE — 85025 COMPLETE CBC W/AUTO DIFF WBC: CPT | Performed by: NURSE PRACTITIONER

## 2025-05-26 PROCEDURE — 74177 CT ABD & PELVIS W/CONTRAST: CPT

## 2025-05-26 PROCEDURE — 99285 EMERGENCY DEPT VISIT HI MDM: CPT

## 2025-05-26 PROCEDURE — 85379 FIBRIN DEGRADATION QUANT: CPT | Performed by: NURSE PRACTITIONER

## 2025-05-26 PROCEDURE — 96360 HYDRATION IV INFUSION INIT: CPT

## 2025-05-26 PROCEDURE — 25810000003 SODIUM CHLORIDE 0.9 % SOLUTION: Performed by: NURSE PRACTITIONER

## 2025-05-26 PROCEDURE — 87637 SARSCOV2&INF A&B&RSV AMP PRB: CPT | Performed by: EMERGENCY MEDICINE

## 2025-05-26 PROCEDURE — 71275 CT ANGIOGRAPHY CHEST: CPT

## 2025-05-26 PROCEDURE — 83605 ASSAY OF LACTIC ACID: CPT | Performed by: NURSE PRACTITIONER

## 2025-05-26 PROCEDURE — 80053 COMPREHEN METABOLIC PANEL: CPT | Performed by: NURSE PRACTITIONER

## 2025-05-26 PROCEDURE — 0202U NFCT DS 22 TRGT SARS-COV-2: CPT | Performed by: NURSE PRACTITIONER

## 2025-05-26 PROCEDURE — 25510000001 IOPAMIDOL PER 1 ML: Performed by: EMERGENCY MEDICINE

## 2025-05-26 RX ORDER — IBUPROFEN 600 MG/1
600 TABLET, FILM COATED ORAL ONCE
Status: COMPLETED | OUTPATIENT
Start: 2025-05-26 | End: 2025-05-26

## 2025-05-26 RX ORDER — IOPAMIDOL 755 MG/ML
100 INJECTION, SOLUTION INTRAVASCULAR
Status: COMPLETED | OUTPATIENT
Start: 2025-05-26 | End: 2025-05-26

## 2025-05-26 RX ORDER — DOXYCYCLINE 100 MG/1
100 CAPSULE ORAL 2 TIMES DAILY
Qty: 20 CAPSULE | Refills: 0 | Status: SHIPPED | OUTPATIENT
Start: 2025-05-26 | End: 2025-06-05

## 2025-05-26 RX ORDER — SODIUM CHLORIDE 0.9 % (FLUSH) 0.9 %
10 SYRINGE (ML) INJECTION AS NEEDED
Status: DISCONTINUED | OUTPATIENT
Start: 2025-05-26 | End: 2025-05-26 | Stop reason: HOSPADM

## 2025-05-26 RX ORDER — DOXYCYCLINE 100 MG/1
100 CAPSULE ORAL ONCE
Status: COMPLETED | OUTPATIENT
Start: 2025-05-26 | End: 2025-05-26

## 2025-05-26 RX ADMIN — IOPAMIDOL 100 ML: 755 INJECTION, SOLUTION INTRAVENOUS at 10:13

## 2025-05-26 RX ADMIN — SODIUM CHLORIDE 1000 ML: 9 INJECTION, SOLUTION INTRAVENOUS at 10:11

## 2025-05-26 RX ADMIN — IBUPROFEN 600 MG: 600 TABLET, FILM COATED ORAL at 09:33

## 2025-05-26 RX ADMIN — DOXYCYCLINE 100 MG: 100 CAPSULE ORAL at 10:41

## 2025-05-26 NOTE — DISCHARGE INSTRUCTIONS
Be sure to drink plenty of fluids.  Take Tylenol and ibuprofen as needed for fever and bodyaches.    Advance your diet as tolerated    Return to the emergency department for worsening symptoms, coughing up blood, vomiting up blood or having bloody diarrhea, chest pain or shortness of breath    Take the antibiotics as directed and until gone

## 2025-05-26 NOTE — FSED PROVIDER NOTE
Subjective   History of Present Illness  49-year-old female presents with cold symptoms and not feeling well.  Her  reports that she has a headache, she is hoarse and has had decreased activity.  She does have a history of a DVT of the left upper extremity, factor VIII deficiency, POTS, PE, RA, Sjogren's syndrome and history of UTI.  Patient's temperature is 101.7.  She has not taken any medication for that at this time.  She also has a JPEG tube in her abdomen and is having some pain around that.    History provided by:  Patient   used: No        Review of Systems   Constitutional:  Positive for fever (101.7).   Respiratory:  Positive for cough and shortness of breath.    Cardiovascular:  Negative for chest pain.   Gastrointestinal:  Positive for abdominal pain.   Neurological:  Positive for headaches.   All other systems reviewed and are negative.      Past Medical History:   Diagnosis Date    Acute deep vein thrombosis (DVT) of brachial vein of left upper extremity 11/5/2021    Anxiety     Central line clotted 7/21/2020    Common bile duct stone 11/23/2021    Elevated factor VIII level     Failure to thrive in adult 10/29/2021    Fibromyalgia, primary     GERD (gastroesophageal reflux disease) 8/17/2020    Headache     Insomnia     Major depressive disorder 4/11/2019    Numbness and tingling of upper and lower extremities of both sides     Other dietary vitamin B12 deficiency anemia 11/9/2018    POTS (postural orthostatic tachycardia syndrome)     Pulmonary embolism 10/1/2020    Rheumatoid arthritis     Sjogren's syndrome     Urinary tract infection        Allergies   Allergen Reactions    Cefazolin Itching    Chlorhexidine Rash    Morphine Unknown (See Comments)     Patient trouble awaking 24 hours after one dose.    Vancomycin Hives    Hydroxyzine Itching    Povidone Iodine Rash    Pregabalin Angioedema and Swelling    Adhesive Tape Other (See Comments)     SKIN TEARS OR CUTS (OTHER  THAN SURGERY):FIND:PT:^PATIENT:ORD:MDS    Butorphanol     Meperidine     Phenergan [Promethazine] Dystonia    Reglan [Metoclopramide] Dystonia    Keflex [Cephalexin] Rash       Past Surgical History:   Procedure Laterality Date    BONE MARROW BIOPSY      CHOLECYSTECTOMY      COLONOSCOPY      GASTROSTOMY-JEJEUNOSTOMY TUBE CHANGE/PLACEMENT  03/2019    TONSILLECTOMY      TOTAL ABDOMINAL HYSTERECTOMY WITH SALPINGO OOPHORECTOMY  2007    ovarian cysts, menorrhagia, anemia    TUNNELED VENOUS CATHETER PLACEMENT         Family History   Problem Relation Age of Onset    Rheum arthritis Mother     Lupus Mother     Bipolar disorder Mother     Skin cancer Mother     Thyroid disease Mother     Skin cancer Father     Heart disease Father     Stroke Father     Transient ischemic attack Father     Migraines Brother     Anxiety disorder Brother     Raynaud syndrome Brother     Skin cancer Brother     Lupus Maternal Grandmother     Cirrhosis Maternal Grandmother         non-alcoholic    Heart disease Maternal Grandmother     Rheum arthritis Maternal Grandmother     Ulcerative colitis Maternal Grandmother     Psoriasis Daughter     Cirrhosis Maternal Aunt         non-alcoholic    Heart disease Maternal Aunt     Lupus Maternal Aunt     Thyroid disease Maternal Aunt     Cirrhosis Maternal Uncle         non-alcoholic    Heart disease Paternal Uncle     Breast cancer Neg Hx     Colon cancer Neg Hx        Social History     Socioeconomic History    Marital status:      Spouse name: Oz Mallory   Tobacco Use    Smoking status: Never    Smokeless tobacco: Never   Substance and Sexual Activity    Alcohol use: No    Drug use: No    Sexual activity: Yes     Partners: Male           Objective   Physical Exam    Procedures           ED Course  ED Course as of 05/26/25 1045   Mon May 26, 2025   0936 COVID19: Not Detected [SJ]   0936 Influenza A PCR: Not Detected [SJ]   0936 Influenza B PCR: Not Detected [SJ]   1001 WBC: 8.15 [SJ]   1001  RBC: 4.77 [SJ]   1001 Hemoglobin: 13.3 [SJ]   1001 Hematocrit: 42.4 [SJ]   1001 MCV: 88.9 [SJ]   1001 MCH: 27.9 [SJ]   1001 MCHC(!): 31.4 [SJ]   1001 RDW: 12.9 [SJ]   1001 RDW-SD: 42.2 [SJ]   1001 MPV: 10.6 [SJ]   1001 Platelets: 155 [SJ]   1001 Neutrophil Rel %(!): 78.9 [SJ]   1001 Lymphocyte Rel %(!): 10.8 [SJ]   1001 Monocyte Rel %: 9.3 [SJ]   1001 Eosinophil Rel %(!): 0.2 [SJ]   1001 Basophil Rel %: 0.4 [SJ]   1001 Immature Granulocyte Rel %: 0.4 [SJ]   1001 Neutrophils Absolute: 6.43 [SJ]   1001 Lymphocytes Absolute: 0.88 [SJ]   1001 Monocytes Absolute: 0.76 [SJ]   1001 Eosinophils Absolute: 0.02 [SJ]   1001 Basophils Absolute: 0.03 [SJ]   1001 Immature Grans, Absolute: 0.03 [SJ]   1001 Lactate: 1.0 [SJ]   1001 D-Dimer, Quant(!): 0.59 [SJ]   1001 RSV, PCR: Not Detected [SJ]   1001 Glucose(!): 120 [SJ]   1001 BUN: 9 [SJ]   1001 Creatinine(!): 1.18 [SJ]   1001 Sodium(!): 134 [SJ]   1001 Potassium: 3.8 [SJ]   1001 Chloride: 98 [SJ]   1001 CO2: 22.8 [SJ]   1001 Calcium: 8.9 [SJ]   1001 Total Protein: 7.8 [SJ]   1001 Albumin: 4.4 [SJ]   1001 ALT (SGPT): 23 [SJ]   1001 AST (SGOT)(!): 33 [SJ]   1001 Alkaline Phosphatase(!): 145 [SJ]   1001 Total Bilirubin: 0.3 [SJ]   1001 Globulin: 3.4 [SJ]   1001 A/G Ratio: 1.3 [SJ]   1001 BUN/Creatinine Ratio: 7.6 [SJ]   1001 Anion Gap: 13.2 [SJ]   1001 eGFR(!): 56.4 [SJ]   1001 Chest x-ray canceled, CT PE protocol was ordered, she does have an elevated D-dimer even age-adjusted.  0.59 [SJ]   1032 IMPRESSION:  Impression:  No evidence of pulmonary embolism.     Mild patchy and tree-in-bud groundglass opacities in the right lower lobe, likely infectious or inflammatory.     No acute findings in the abdomen or pelvis.              Electronically Signed: Grant Uriarte MD    5/26/2025 10:28 AM EDT    Workstation ID: [id number]   []      ED Course User Index  [SJ] Annmarie Willis, ANGELITO                                           Medical Decision Making  49-year-old female presents with  cold symptoms and not feeling well.  Her  reports that she has a headache, she is hoarse and has had decreased activity.  She does have a history of a DVT of the left upper extremity, factor VIII deficiency, POTS, PE, RA, Sjogren's syndrome and history of UTI.  Patient's temperature is 101.7.  She has not taken any medication for that at this time.  She also has a JPEG tube in her abdomen and is having some pain around that.  Differential diagnoses are vast.  Patient has history of several different problems.  Her lab work did show an elevated D-dimer.  I did send PE protocol, CT abdomen and pelvis with contrast.  It showed a right lower lobe pneumonia.  Patient was given 1 L of IV fluids, patient's vital signs have been stable.  Her O2 sat is 98% on room air.  Patient does have oxygen at home that she can use as needed.  Patient was given doxycycline here, medication will be sent to the pharmacy [facility].   checked to make sure it is open on memorial day.  It is.  Reasons for return were discussed with the family, they verbalized understanding.      Problems Addressed:  Pneumonia of right lower lobe due to infectious organism: complicated acute illness or injury    Amount and/or Complexity of Data Reviewed  Labs: ordered. Decision-making details documented in ED Course.  Radiology: ordered.    Risk  Prescription drug management.        Final diagnoses:   Pneumonia of right lower lobe due to infectious organism       ED Disposition  ED Disposition       ED Disposition   Discharge    Condition   Stable    Comment   --               Yvette Cooper MD  [address] [place] [address], [address] [place] [id number] [phone]    Call            Medication List        New Prescriptions      doxycycline 100 MG capsule  Commonly known as: MONODOX  Take 1 capsule by mouth 2 (Two) Times a Day for 10 days.               Where to Get Your Medications        These medications were sent to [id number] -  [place], [place] - [address] - [phone]  - [phone] FX  [address] [address] [place] [address]      Phone: [phone]   doxycycline 100 MG capsule

## 2025-07-25 ENCOUNTER — TRANSCRIBE ORDERS (OUTPATIENT)
Dept: ADMINISTRATIVE | Facility: HOSPITAL | Age: 50
End: 2025-07-25
Payer: MEDICARE

## 2025-07-25 DIAGNOSIS — Z12.31 SCREENING MAMMOGRAM FOR BREAST CANCER: Primary | ICD-10-CM

## 2025-07-25 DIAGNOSIS — Z78.0 POSTMENOPAUSAL: ICD-10-CM

## 2025-08-19 LAB
NCCN CRITERIA FLAG: NORMAL
TYRER CUZICK SCORE: 4

## 2025-08-20 ENCOUNTER — HOSPITAL ENCOUNTER (OUTPATIENT)
Dept: BONE DENSITY | Facility: HOSPITAL | Age: 50
Discharge: HOME | End: 2025-08-20
Payer: MEDICARE

## 2025-08-20 ENCOUNTER — HOSPITAL ENCOUNTER (OUTPATIENT)
Dept: MAMMOGRAPHY | Facility: HOSPITAL | Age: 50
Discharge: HOME | End: 2025-08-20
Payer: MEDICARE

## 2025-08-20 DIAGNOSIS — Z78.0 POSTMENOPAUSAL: ICD-10-CM

## 2025-08-20 DIAGNOSIS — Z12.31 SCREENING MAMMOGRAM FOR BREAST CANCER: ICD-10-CM

## 2025-08-20 PROCEDURE — 77067 SCR MAMMO BI INCL CAD: CPT

## 2025-08-20 PROCEDURE — 77080 DXA BONE DENSITY AXIAL: CPT

## 2025-08-20 PROCEDURE — 77063 BREAST TOMOSYNTHESIS BI: CPT
